# Patient Record
Sex: MALE | Race: WHITE | Employment: OTHER | URBAN - METROPOLITAN AREA
[De-identification: names, ages, dates, MRNs, and addresses within clinical notes are randomized per-mention and may not be internally consistent; named-entity substitution may affect disease eponyms.]

---

## 2017-03-29 ENCOUNTER — HOSPITAL ENCOUNTER (EMERGENCY)
Age: 47
Discharge: HOME | End: 2017-03-29
Attending: EMERGENCY MEDICINE
Payer: COMMERCIAL

## 2017-03-29 ENCOUNTER — APPOINTMENT (OUTPATIENT)
Dept: ULTRASOUND IMAGING | Age: 47
End: 2017-03-29
Attending: EMERGENCY MEDICINE
Payer: COMMERCIAL

## 2017-03-29 VITALS
DIASTOLIC BLOOD PRESSURE: 83 MMHG | HEART RATE: 99 BPM | HEIGHT: 69 IN | TEMPERATURE: 98 F | WEIGHT: 200 LBS | OXYGEN SATURATION: 99 % | BODY MASS INDEX: 29.62 KG/M2 | SYSTOLIC BLOOD PRESSURE: 139 MMHG | RESPIRATION RATE: 18 BRPM

## 2017-03-29 DIAGNOSIS — I80.8 THROMBOPHLEBITIS ARM: Primary | ICD-10-CM

## 2017-03-29 PROCEDURE — 99284 EMERGENCY DEPT VISIT MOD MDM: CPT | Performed by: EMERGENCY MEDICINE

## 2017-03-29 PROCEDURE — 93971 EXTREMITY STUDY: CPT

## 2017-03-29 PROCEDURE — 74011250637 HC RX REV CODE- 250/637: Performed by: EMERGENCY MEDICINE

## 2017-03-29 RX ORDER — SULFAMETHOXAZOLE AND TRIMETHOPRIM 800; 160 MG/1; MG/1
1 TABLET ORAL 2 TIMES DAILY
Qty: 14 TAB | Refills: 0 | Status: SHIPPED | OUTPATIENT
Start: 2017-03-29 | End: 2017-04-05

## 2017-03-29 RX ORDER — OXYCODONE HYDROCHLORIDE 5 MG/1
5 TABLET ORAL
Status: COMPLETED | OUTPATIENT
Start: 2017-03-29 | End: 2017-03-29

## 2017-03-29 RX ADMIN — OXYCODONE HYDROCHLORIDE 5 MG: 5 TABLET ORAL at 09:12

## 2017-03-29 NOTE — DISCHARGE INSTRUCTIONS
Hematoma: Care Instructions  Your Care Instructions  A hematoma is a bad bruise. It happens when an injury causes blood to collect and pool under the skin. The pooling blood gives the skin a spongy, rubbery, lumpy feel. A hematoma usually is not a cause for concern. It is not the same thing as a blood clot in a vein, and it does not cause blood clots. Follow-up care is a key part of your treatment and safety. Be sure to make and go to all appointments, and call your doctor if you are having problems. It's also a good idea to know your test results and keep a list of the medicines you take. How can you care for yourself at home? · Rest and protect the bruised area. · Put ice or a cold pack on the area for 10 to 20 minutes at a time. · Prop up the bruised area on a pillow when you ice it or anytime you sit or lie down during the next 3 days. Try to keep it above the level of your heart. This will help reduce swelling. · Wrapping the bruised area with an elastic bandage such as an Ace wrap will help decrease swelling. Don't wrap it too tightly, as this can cause more swelling below the affected area. · Take an over-the-counter pain medicine, such as acetaminophen (Tylenol), ibuprofen (Advil, Motrin), or naproxen (Aleve). · Do not take two or more pain medicines at the same time unless the doctor told you to. Many pain medicines have acetaminophen, which is Tylenol. Too much acetaminophen (Tylenol) can be harmful. When should you call for help? Call your doctor now or seek immediate medical care if:  · You have signs of skin infection, such as:  ¨ Increased pain, swelling, warmth, or redness. ¨ Red streaks leading from the area. ¨ Pus draining from the area. ¨ A fever. Watch closely for changes in your health, and be sure to contact your doctor if:  · The bruise lasts longer than 4 weeks. · The bruise gets bigger or becomes more painful. · You do not get better as expected.   Where can you learn more?  Go to http://tessie-georgina.info/. Enter P911 in the search box to learn more about \"Hematoma: Care Instructions. \"  Current as of: May 27, 2016  Content Version: 11.2  © 4835-5425 [employer]. Care instructions adapted under license by [employer] (which disclaims liability or warranty for this information). If you have questions about a medical condition or this instruction, always ask your healthcare professional. [address] any warranty or liability for your use of this information.

## 2017-03-29 NOTE — ED PROVIDER NOTES
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 Then, 2 days ago, he was hit with a bungee cord in the left antecubital region. He has been being treated for a paronychia of his left thumb for the past several days with doxycycline and Voltaren. He then saw his primary care physician yesterday for pain and swelling in his left AC region. He was started on Ultram and zofran for pain. He had x-ray to rule out foreign body. He reports increased pain and swelling to the before meals region. He's had one episode of vomiting. No fever. He denies IV drug use. Patient is a 51 y.o. male presenting with arm problem. The history is provided by the patient. Arm Injury    Pertinent negatives include no back pain. Past Medical History:   Diagnosis Date    Hepatitis C     History of drug abuse        Past Surgical History:   Procedure Laterality Date    HX HERNIA REPAIR           Family History:   Problem Relation Age of Onset    Psychiatric Disorder Mother     Cancer Mother      leukemia    Alcohol abuse Father      Drug abuse       Social History     Social History    Marital status:      Spouse name: N/A    Number of children: N/A    Years of education: N/A     Occupational History    Not on file. Social History Main Topics    Smoking status: Current Every Day Smoker     Packs/day: 1.70     Years: 20.00     Types: Cigarettes    Smokeless tobacco: Never Used    Alcohol use No    Drug use: Yes     Special: Opiates      Comment: History of    Sexual activity: Not on file     Other Topics Concern    Not on file     Social History Narrative         ALLERGIES: Review of patient's allergies indicates no known allergies. Review of Systems   Constitutional: Negative for chills and fever. HENT: Negative for hearing loss. Eyes: Negative for visual disturbance.    Respiratory: Negative for cough and shortness of breath. Cardiovascular: Negative for chest pain and palpitations. Gastrointestinal: Positive for nausea and vomiting. Negative for abdominal pain and diarrhea. Musculoskeletal: Positive for myalgias. Negative for back pain. Skin: Positive for color change. Negative for rash. Neurological: Negative for weakness and headaches. Psychiatric/Behavioral: Negative for confusion. Vitals:    03/29/17 0841   BP: 144/82   Pulse: 99   Resp: 17   Temp: 97.9 °F (36.6 °C)   SpO2: 99%   Weight: 90.7 kg (200 lb)   Height: 5' 9\" (1.753 m)            Physical Exam   Constitutional: He appears well-developed and well-nourished. HENT:   Head: Normocephalic and atraumatic. Right Ear: External ear normal.   Left Ear: External ear normal.   Nose: Nose normal.   Mouth/Throat: Oropharynx is clear and moist.   Eyes: Conjunctivae are normal. Pupils are equal, round, and reactive to light. Neck: Normal range of motion. Neck supple. Cardiovascular: Regular rhythm, normal heart sounds and intact distal pulses. Pulmonary/Chest: Effort normal and breath sounds normal. No respiratory distress. He has no wheezes. Abdominal: Soft. Bowel sounds are normal. He exhibits no distension. There is no tenderness. Musculoskeletal: Normal range of motion. Left elbow: He exhibits swelling. He exhibits normal range of motion. Tenderness found. Arms:  Neurological: He is alert. Skin: Skin is warm and dry. Psychiatric: Judgment normal.   Nursing note and vitals reviewed. MDM  Number of Diagnoses or Management Options  Diagnosis management comments: Parts of this document were created using dragon voice recognition software. The chart has been reviewed but errors may still be present. Appears infectious. Patient denies IV drug use. Will check ultrasound. Tetanus up-to-date. Pain treated. 11:01 AM  No abscess or DVT appreciated on ultrasound.   Only has a few days left of doxycycline, could be early thrombophlebitis or infection. Will place on Bactrim. Advised to continue ultram.     I discussed the results of all labs, procedures, radiographs, and treatments with the patient and available family. Treatment plan is agreed upon and the patient is ready for discharge. Questions about treatment in the ED and differential diagnosis of presenting condition were answered. Patient was given verbal discharge instructions including, but not limited to, importance of returning to the emergency department for any concern of worsening or continued symptoms. Instructions were given to follow up with a primary care provider or specialist within 1-2 days. Adverse effects of medications, if prescribed, were discussed and patient was advised to refrain from significant physical activity until followed up by primary care physician and to not drive or operate heavy machinery after taking any sedating substances. Amount and/or Complexity of Data Reviewed  Tests in the radiology section of CPT®: ordered and reviewed (Duplex Upper Ext Venous Left    Result Date: 3/29/2017  LEFT UPPER EXTREMITY VENOUS DUPLEX ULTRASOUND. HISTORY: Pain. TECHNIQUE: Direct skin-contact high resolution grayscale images, color-flow Doppler and duplex waveform analysis. FINDINGS: There is color-flow assignment on color-flow Doppler and duplex venous waveforms in the internal jugular, subclavian, axillary, brachial, radial and ulnar veins. The peripheral veins are compressible. There is thrombus in a superficial vein. Probable small, 1.2 x 1.4 cm hematoma in the soft tissues. IMPRESSION: Negative for sonographic evidence of deep venous thrombosis left upper extremity.  Other findings as above.     )  Tests in the medicine section of CPT®: ordered and reviewed  Review and summarize past medical records: yes      ED Course       Procedures

## 2017-03-29 NOTE — ED TRIAGE NOTES
Pt in c/o left arm pain x 2 days. States he hit it with a nail and it began bleeding 2 days ago. States seen by pcp and prescribed doxycycline. States yesterday he hit the same arm with a bungie cord and has had increased pain since then.

## 2017-03-30 ENCOUNTER — HOSPITAL ENCOUNTER (EMERGENCY)
Age: 47
Discharge: HOME | End: 2017-03-30
Attending: EMERGENCY MEDICINE
Payer: COMMERCIAL

## 2017-03-30 VITALS
HEART RATE: 82 BPM | RESPIRATION RATE: 16 BRPM | HEIGHT: 69 IN | OXYGEN SATURATION: 100 % | DIASTOLIC BLOOD PRESSURE: 84 MMHG | TEMPERATURE: 98.2 F | WEIGHT: 200 LBS | BODY MASS INDEX: 29.62 KG/M2 | SYSTOLIC BLOOD PRESSURE: 142 MMHG

## 2017-03-30 DIAGNOSIS — L03.114 CELLULITIS OF LEFT ARM: ICD-10-CM

## 2017-03-30 DIAGNOSIS — L02.414 ABSCESS OF LEFT ARM: Primary | ICD-10-CM

## 2017-03-30 PROCEDURE — 87186 SC STD MICRODIL/AGAR DIL: CPT | Performed by: EMERGENCY MEDICINE

## 2017-03-30 PROCEDURE — 99283 EMERGENCY DEPT VISIT LOW MDM: CPT | Performed by: EMERGENCY MEDICINE

## 2017-03-30 PROCEDURE — 74011250637 HC RX REV CODE- 250/637: Performed by: EMERGENCY MEDICINE

## 2017-03-30 PROCEDURE — 87205 SMEAR GRAM STAIN: CPT | Performed by: EMERGENCY MEDICINE

## 2017-03-30 PROCEDURE — 75810000289 HC I&D ABSCESS SIMP/COMP/MULT: Performed by: EMERGENCY MEDICINE

## 2017-03-30 PROCEDURE — 87077 CULTURE AEROBIC IDENTIFY: CPT | Performed by: EMERGENCY MEDICINE

## 2017-03-30 PROCEDURE — 77030019895 HC PCKNG STRP IODO -A

## 2017-03-30 RX ORDER — CLINDAMYCIN HYDROCHLORIDE 150 MG/1
150 CAPSULE ORAL 4 TIMES DAILY
Qty: 28 CAP | Refills: 0 | Status: SHIPPED | OUTPATIENT
Start: 2017-03-30 | End: 2017-04-06

## 2017-03-30 RX ORDER — IBUPROFEN 800 MG/1
800 TABLET ORAL
Status: COMPLETED | OUTPATIENT
Start: 2017-03-30 | End: 2017-03-30

## 2017-03-30 RX ADMIN — IBUPROFEN 800 MG: 800 TABLET, FILM COATED ORAL at 10:11

## 2017-03-30 NOTE — ED TRIAGE NOTES
Pt has swelling in middle of left arm that is worse since evaluation yesterday. States he is taking antibiotics and had an ultrasound here yesterday but the swelling is worse.

## 2017-03-30 NOTE — DISCHARGE INSTRUCTIONS
Skin Abscess: Care Instructions  Your Care Instructions    A skin abscess is a bacterial infection that forms a pocket of pus. A boil is a kind of skin abscess. The doctor may have cut an opening in the abscess so that the pus can drain out. You may have gauze in the cut so that the abscess will stay open and keep draining. You may need antibiotics. You will need to follow up with your doctor to make sure the infection has gone away. The doctor has checked you carefully, but problems can develop later. If you notice any problems or new symptoms, get medical treatment right away. Follow-up care is a key part of your treatment and safety. Be sure to make and go to all appointments, and call your doctor if you are having problems. It's also a good idea to know your test results and keep a list of the medicines you take. How can you care for yourself at home? · Apply warm and dry compresses, a heating pad set on low, or a hot water bottle 3 or 4 times a day for pain. Keep a cloth between the heat source and your skin. · If your doctor prescribed antibiotics, take them as directed. Do not stop taking them just because you feel better. You need to take the full course of antibiotics. · Take pain medicines exactly as directed. ¨ If the doctor gave you a prescription medicine for pain, take it as prescribed. ¨ If you are not taking a prescription pain medicine, ask your doctor if you can take an over-the-counter medicine. · Keep your bandage clean and dry. Change the bandage whenever it gets wet or dirty, or at least one time a day. · If the abscess was packed with gauze:  ¨ Keep follow-up appointments to have the gauze changed or removed. If the doctor instructed you to remove the gauze, gently pull out all of the gauze when your doctor tells you to. ¨ After the gauze is removed, soak the area in warm water for 15 to 20 minutes 2 times a day, until the wound closes. When should you call for help?   Call your doctor now or seek immediate medical care if:  · You have signs of worsening infection, such as:  ¨ Increased pain, swelling, warmth, or redness. ¨ Red streaks leading from the infected skin. ¨ Pus draining from the wound. ¨ A fever. Watch closely for changes in your health, and be sure to contact your doctor if:  · You do not get better as expected. Where can you learn more? Go to http://tessie-georgina.info/. Enter N523 in the search box to learn more about \"Skin Abscess: Care Instructions. \"  Current as of: October 13, 2016  Content Version: 11.2  © 4880-0766 [employer]. Care instructions adapted under license by [employer] (which disclaims liability or warranty for this information). If you have questions about a medical condition or this instruction, always ask your healthcare professional. [place] [address] any warranty or liability for your use of this information. Cellulitis: Care Instructions  Your Care Instructions    Cellulitis is a skin infection. It often occurs after a break in the skin from a scrape, cut, bite, or puncture, or after a rash. The doctor has checked you carefully, but problems can develop later. If you notice any problems or new symptoms, get medical treatment right away. Follow-up care is a key part of your treatment and safety. Be sure to make and go to all appointments, and call your doctor if you are having problems. It's also a good idea to know your test results and keep a list of the medicines you take. How can you care for yourself at home? · Take your antibiotics as directed. Do not stop taking them just because you feel better. You need to take the full course of antibiotics. · Prop up the infected area on pillows to reduce pain and swelling. Try to keep the area above the level of your heart as often as you can.   · If your doctor told you how to care for your wound, follow your doctor's instructions. If you did not get instructions, follow this general advice:  ¨ Wash the wound with clean water 2 times a day. Don't use hydrogen peroxide or alcohol, which can slow healing. ¨ You may cover the wound with a thin layer of petroleum jelly, such as Vaseline, and a nonstick bandage. ¨ Apply more petroleum jelly and replace the bandage as needed. · Be safe with medicines. Take pain medicines exactly as directed. ¨ If the doctor gave you a prescription medicine for pain, take it as prescribed. ¨ If you are not taking a prescription pain medicine, ask your doctor if you can take an over-the-counter medicine. To prevent cellulitis in the future  · Try to prevent cuts, scrapes, or other injuries to your skin. Cellulitis most often occurs where there is a break in the skin. · If you get a scrape, cut, mild burn, or bite, wash the wound with clean water as soon as you can to help avoid infection. Don't use hydrogen peroxide or alcohol, which can slow healing. · If you have swelling in your legs (edema), support stockings and good skin care may help prevent leg sores and cellulitis. · Take care of your feet, especially if you have diabetes or other conditions that increase the risk of infection. Wear shoes and socks. Do not go barefoot. If you have athlete's foot or other skin problems on your feet, talk to your doctor about how to treat them. When should you call for help? Call your doctor now or seek immediate medical care if:  · You have signs that your infection is getting worse, such as:  ¨ Increased pain, swelling, warmth, or redness. ¨ Red streaks leading from the area. ¨ Pus draining from the area. ¨ A fever. · You get a rash. Watch closely for changes in your health, and be sure to contact your doctor if:  · You are not getting better after 1 day (24 hours). · You do not get better as expected. Where can you learn more? Go to http://marichuy.info/.   Keron Gilliam in the search box to learn more about \"Cellulitis: Care Instructions. \"  Current as of: October 13, 2016  Content Version: 11.2  © 5462-9745 [email or web address]. Care instructions adapted under license by [employer] (which disclaims liability or warranty for this information). If you have questions about a medical condition or this instruction, always ask your healthcare professional. [place] [address] any warranty or liability for your use of this information.

## 2017-03-30 NOTE — ED PROVIDER NOTES
HPI Comments: 78-year-old with a history of IV drug abuse he recently injected unknown dissolved pill 3-4 days ago presents with pain and swelling and redness to his left antecubital fossa. He was seen here yesterday had an ultrasound of the arm that showed no DVT and supposedly no abscess. He was started on Bactrim possibly doxycycline. No improvement and patient feels worse with increased pain. Patient is a 51 y.o. male presenting with skin infection. The history is provided by the patient. Skin Infection   This is a new problem. The current episode started 2 days ago. The problem occurs constantly. The problem has been gradually worsening. Pertinent negatives include no chest pain and no shortness of breath. Exacerbated by: movement and palpation. Nothing relieves the symptoms. Treatments tried: antibiotic started yesterday. The treatment provided no relief. Past Medical History:   Diagnosis Date    Hepatitis C     History of drug abuse        Past Surgical History:   Procedure Laterality Date    HX HERNIA REPAIR           Family History:   Problem Relation Age of Onset    Psychiatric Disorder Mother     Cancer Mother      leukemia    Alcohol abuse Father      Drug abuse       Social History     Social History    Marital status:      Spouse name: N/A    Number of children: N/A    Years of education: N/A     Occupational History    Not on file. Social History Main Topics    Smoking status: Current Every Day Smoker     Packs/day: 1.70     Years: 20.00     Types: Cigarettes    Smokeless tobacco: Never Used    Alcohol use No    Drug use: Yes     Special: Opiates      Comment: History of    Sexual activity: Not on file     Other Topics Concern    Not on file     Social History Narrative         ALLERGIES: Review of patient's allergies indicates no known allergies. Review of Systems   Constitutional: Positive for fever. Negative for chills.    Respiratory: Negative for cough and shortness of breath. Cardiovascular: Negative for chest pain and palpitations. Gastrointestinal: Negative for nausea and vomiting. Musculoskeletal: Negative for myalgias. Skin: Positive for color change and wound. Neurological: Negative for weakness and numbness. Vitals:    03/30/17 0928   BP: 144/87   Pulse: 85   Resp: 16   Temp: 98 °F (36.7 °C)   SpO2: 100%   Weight: 90.7 kg (200 lb)   Height: 5' 9\" (1.753 m)            Physical Exam   Constitutional: He appears well-developed and well-nourished. HENT:   Mouth/Throat: Oropharynx is clear and moist. No oropharyngeal exudate. Eyes: Conjunctivae are normal. Pupils are equal, round, and reactive to light. Cardiovascular: Normal rate, regular rhythm, normal heart sounds and intact distal pulses. No murmur heard. Pulmonary/Chest: Effort normal and breath sounds normal.   Abdominal: Soft. Bowel sounds are normal. He exhibits no distension. There is no tenderness. Musculoskeletal: Normal range of motion. Neurological: He is alert. Skin: Skin is warm and dry. Erythema about the entire antecubital fossa with 2-3 cm diameter area of fluctuance and tenderness centrally located. Nursing note and vitals reviewed. MDM  Number of Diagnoses or Management Options  Diagnosis management comments: Probable abscess left antecubital fossa from recent IV drug use. Ultrasound yesterday showed no DVT. Bedside ED ultrasound with the linear probe reveals a 2 cm diameter abscess that is just under a centimeter deep to 2 cm deep. I do not appreciate any arterial flow adjacent superficial 2 or deep to the abscess. I do not appreciate any veins in the immediate area.       ED Course       I&D Abcess Complex  Date/Time: 3/30/2017 10:30 AM  Performed by: Sienna Cunningham  Authorized by: Sienna Cunningham     Consent:     Consent obtained:  Verbal    Consent given by:  Patient    Risks discussed:  Bleeding, damage to other organs, infection, pain and incomplete drainage    Alternatives discussed:  No treatment  Location:     Type:  Abscess    Location:  Upper extremity  Pre-procedure details:     Skin preparation:  Betadine  Anesthesia (see MAR for exact dosages): Anesthesia method:  Local infiltration  Procedure type:     Complexity:  Complex  Procedure details:     Needle aspiration: yes      Needle size:  18 G    Incision types:  Single straight    Incision depth:  Subcutaneous    Scalpel blade:  11    Wound management:  Probed and deloculated    Drainage:  Purulent and bloody    Drainage amount:  Copious    Wound treatment:  Wound left open    Packing materials:  1/4 in iodoform gauze  Post-procedure details:     Patient tolerance of procedure: Tolerated well, no immediate complications  Comments:      Ultrasound guidance and evaluation was used. Neurovascularly intact following the procedure.

## 2017-03-30 NOTE — Clinical Note
Continue current antibiotic. Take Keflex as well. Continue your phone parent and 30 taking otherwise he can take ibuprofen for pain as well. Tylenol for pain. Return in 48 hours for wound recheck and packing removal.  Return sooner if worse.

## 2017-04-02 LAB
BACTERIA SPEC CULT: ABNORMAL
GRAM STN SPEC: ABNORMAL
GRAM STN SPEC: ABNORMAL
SERVICE CMNT-IMP: ABNORMAL

## 2017-04-11 NOTE — PROGRESS NOTES
Spoke with patient- he states the wound is completely healed; no problems fever, nausea, vomiting. Declines offer for additional antibiotics. Has a PCP- he will follow-up if necessary.

## 2017-04-15 ENCOUNTER — HOSPITAL ENCOUNTER (EMERGENCY)
Age: 47
Discharge: HOME | End: 2017-04-15
Attending: STUDENT IN AN ORGANIZED HEALTH CARE EDUCATION/TRAINING PROGRAM
Payer: COMMERCIAL

## 2017-04-15 VITALS
BODY MASS INDEX: 29.62 KG/M2 | SYSTOLIC BLOOD PRESSURE: 118 MMHG | DIASTOLIC BLOOD PRESSURE: 74 MMHG | HEIGHT: 69 IN | TEMPERATURE: 97.8 F | OXYGEN SATURATION: 99 % | WEIGHT: 200 LBS | RESPIRATION RATE: 16 BRPM | HEART RATE: 72 BPM

## 2017-04-15 DIAGNOSIS — S01.311A EAR LOBE LACERATION, RIGHT, INITIAL ENCOUNTER: Primary | ICD-10-CM

## 2017-04-15 PROCEDURE — 99283 EMERGENCY DEPT VISIT LOW MDM: CPT | Performed by: STUDENT IN AN ORGANIZED HEALTH CARE EDUCATION/TRAINING PROGRAM

## 2017-04-15 PROCEDURE — 77030002916 HC SUT ETHLN J&J -A

## 2017-04-15 PROCEDURE — 75810000293 HC SIMP/SUPERF WND  RPR: Performed by: STUDENT IN AN ORGANIZED HEALTH CARE EDUCATION/TRAINING PROGRAM

## 2017-04-15 NOTE — DISCHARGE INSTRUCTIONS
Cuts: Care Instructions  Your Care Instructions  A cut can happen anywhere on your body. Stitches, staples, skin adhesives, or pieces of tape called Steri-Strips are sometimes used to keep the edges of a cut together and help it heal. Steri-Strips can be used by themselves or with stitches or staples. Sometimes cuts are left open. If the cut went deep and through the skin, the doctor may have closed the cut in two layers. A deeper layer of stitches brings the deep part of the cut together. These stitches will dissolve and don't need to be removed. The upper layer closure, which could be stitches, staples, Steri-Strips, or adhesive, is what you see on the cut. A cut is often covered by a bandage. The doctor has checked you carefully, but problems can develop later. If you notice any problems or new symptoms, get medical treatment right away. Follow-up care is a key part of your treatment and safety. Be sure to make and go to all appointments, and call your doctor if you are having problems. It's also a good idea to know your test results and keep a list of the medicines you take. How can you care for yourself at home? If a cut is open or closed  · Prop up the sore area on a pillow anytime you sit or lie down during the next 3 days. Try to keep it above the level of your heart. This will help reduce swelling. · Keep the cut dry for the first 24 to 48 hours. After this, you can shower if your doctor okays it. Pat the cut dry. · Don't soak the cut, such as in a bathtub. Your doctor will tell you when it's safe to get the cut wet. · After the first 24 to 48 hours, clean the cut with soap and water 2 times a day unless your doctor gives you different instructions. ¨ Don't use hydrogen peroxide or alcohol, which can slow healing. ¨ You may cover the cut with a thin layer of petroleum jelly and a nonstick bandage.   ¨ If the doctor put a bandage over the cut, put on a new bandage after cleaning the cut or if the bandage gets wet or dirty. · Avoid any activity that could cause your cut to reopen. · Be safe with medicines. Read and follow all instructions on the label. ¨ If the doctor gave you a prescription medicine for pain, take it as prescribed. ¨ If you are not taking a prescription pain medicine, ask your doctor if you can take an over-the-counter medicine. If the cut is closed with stitches, staples, or Steri-Strips  · Follow the above instructions for open or closed cuts. · Do not remove the stitches or staples on your own. Your doctor will tell you when to come back to have the stitches or staples removed. · Leave Steri-Strips on until they fall off. If the cut is closed with a skin adhesive  · Follow the above instructions for open or closed cuts. · Leave the skin adhesive on your skin until it falls off on its own. This may take 5 to 10 days. · Do not scratch, rub, or pick at the adhesive. · Do not put the sticky part of a bandage directly on the adhesive. · Do not put any kind of ointment, cream, or lotion over the area. This can make the adhesive fall off too soon. Do not use hydrogen peroxide or alcohol, which can slow healing. When should you call for help? Call your doctor now or seek immediate medical care if:  · You have new pain, or your pain gets worse. · The skin near the cut is cold or pale or changes color. · You have tingling, weakness, or numbness near the cut. · The cut starts to bleed, and blood soaks through the bandage. Oozing small amounts of blood is normal.  · You have trouble moving the area near the cut. · You have symptoms of infection, such as:  ¨ Increased pain, swelling, warmth, or redness around the cut. ¨ Red streaks leading from the cut. ¨ Pus draining from the cut. ¨ A fever. Watch closely for changes in your health, and be sure to contact your doctor if:  · The cut reopens. · You do not get better as expected. Where can you learn more?   Go to http://tessie-georgina.info/. Enter M735 in the search box to learn more about \"Cuts: Care Instructions. \"  Current as of: May 27, 2016  Content Version: 11.2  © 3976-7132 [employer]. Care instructions adapted under license by [employer] (which disclaims liability or warranty for this information). If you have questions about a medical condition or this instruction, always ask your healthcare professional. [place] [address] any warranty or liability for your use of this information.

## 2017-04-15 NOTE — Clinical Note
Return to this department or follow-up with her primary care provider in 7 days to have sutures removed. Please keep the area clean and dry. Monitor for signs of infection including increased pain, swelling or discharge from the area. Apply antibiotic  ointment several times daily to the area for the first 3 days. Avoid soaking this wound in a tub or pool for at least 48 hours. Return sooner with any concerns or questions.

## 2017-04-16 NOTE — ED PROVIDER NOTES
HPI Comments: 76-year-old male patient presents emergent department with reports of right earlobe pain after sustaining a laceration from his dog just prior to arrival.  Patient states his dog and Were fighting when he attempted to separate the animals and sustained a laceration to the right earlobe from his dog's foot. Patient denies any other injury at this time. Reports his last tetanus shot was administered of present one and half years ago. Patient denies any other complaints at this time. Patient is a 51 y.o. male presenting with skin laceration. The history is provided by the patient. No  was used. Laceration    The incident occurred 1 to 2 hours ago. Pain location: right earlobe. The laceration is 2 cm in size. Injury mechanism: dog toe nail. Foreign body present: no. The pain is at a severity of 4/10. The pain is mild. The pain has been constant since onset. Pertinent negatives include no numbness, no tingling, no weakness, no loss of motion, no coolness and no discoloration. The patient's last tetanus shot was less than 5 years ago. Past Medical History:   Diagnosis Date    Hepatitis C     History of drug abuse        Past Surgical History:   Procedure Laterality Date    HX HERNIA REPAIR           Family History:   Problem Relation Age of Onset    Psychiatric Disorder Mother     Cancer Mother      leukemia    Alcohol abuse Father      Drug abuse       Social History     Social History    Marital status:      Spouse name: N/A    Number of children: N/A    Years of education: N/A     Occupational History    Not on file.      Social History Main Topics    Smoking status: Current Every Day Smoker     Packs/day: 1.70     Years: 20.00     Types: Cigarettes    Smokeless tobacco: Never Used    Alcohol use No    Drug use: Yes     Special: Opiates      Comment: History of    Sexual activity: Not on file     Other Topics Concern    Not on file     Social History Narrative         ALLERGIES: Review of patient's allergies indicates no known allergies. Review of Systems   Constitutional: Negative for chills, diaphoresis and fever. HENT: Negative for congestion, sneezing and sore throat. Eyes: Negative for visual disturbance. Respiratory: Negative for cough, chest tightness, shortness of breath and wheezing. Cardiovascular: Negative for chest pain and leg swelling. Gastrointestinal: Negative for abdominal pain, blood in stool, diarrhea, nausea and vomiting. Endocrine: Negative for polyuria. Genitourinary: Negative for difficulty urinating, dysuria, flank pain, hematuria and urgency. Musculoskeletal: Negative for back pain, myalgias, neck pain and neck stiffness. Skin: Positive for wound. Negative for color change and rash. Neurological: Negative for dizziness, tingling, syncope, speech difficulty, weakness, light-headedness, numbness and headaches. Psychiatric/Behavioral: Negative for behavioral problems. All other systems reviewed and are negative. Vitals:    04/15/17 1701 04/15/17 1842   BP: 114/77 118/74   Pulse: 79 72   Resp: 18 16   Temp: 98.1 °F (36.7 °C) 97.8 °F (36.6 °C)   SpO2: 97% 99%   Weight: 90.7 kg (200 lb)    Height: 5' 9\" (1.753 m)             Physical Exam   Constitutional: He is oriented to person, place, and time. He appears well-developed and well-nourished. No distress. Alert and oriented to person, place and time. No acute distress. Speaks in clear, fluent sentences. HENT:   Head: Normocephalic and atraumatic. Right Ear: External ear normal.   Ears:    Nose: Nose normal.   There is a 2 cm full-thickness laceration to the patient's right earlobe. No visible cartilage exposed. Bleeding is controlled. Intra-auricular exam shows no acute abnormality. No other focal findings. Eyes: Conjunctivae and EOM are normal. Pupils are equal, round, and reactive to light. Neck: Normal range of motion. Neck supple.  No JVD present. No tracheal deviation present. Cardiovascular: Normal rate, regular rhythm, S1 normal, S2 normal, normal heart sounds and intact distal pulses. Exam reveals no gallop, no distant heart sounds and no friction rub. No murmur heard. Pulmonary/Chest: Effort normal and breath sounds normal. No accessory muscle usage or stridor. No tachypnea and no bradypnea. No respiratory distress. He has no decreased breath sounds. He has no wheezes. He has no rhonchi. He has no rales. He exhibits no tenderness. Abdominal: Soft. Normal appearance. He exhibits no distension and no mass. There is no hepatosplenomegaly, splenomegaly or hepatomegaly. There is no tenderness. There is no rigidity, no rebound, no guarding, no CVA tenderness, no tenderness at McBurney's point and negative Nicholas's sign. Musculoskeletal: Normal range of motion. He exhibits no edema, tenderness or deformity. Neurological: He is alert and oriented to person, place, and time. No cranial nerve deficit. Skin: Skin is warm and dry. No rash noted. He is not diaphoretic. Psychiatric: He has a normal mood and affect. His behavior is normal.   Nursing note and vitals reviewed. MDM  Number of Diagnoses or Management Options  Ear lobe laceration, right, initial encounter: new and requires workup  Diagnosis management comments: Laceration closed without difficulty. Patient advised to return in one week for suture removal.  Pulses understanding agreement with this plan. ED Course       Wound Repair  Date/Time: 4/15/2017 9:22 PM  Performed by: attendingPreparation: skin prepped with Betadine  Location: right earlobe.   Wound length:2.5 cm or less  Anesthesia: local infiltration    Anesthesia:  Anesthesia: local infiltration  Local Anesthetic: lidocaine 1% without epinephrine   Foreign bodies: no foreign bodies  Debridement: none  Skin closure: 5-0 nylon  Technique: simple  Approximation: close  Patient tolerance: Patient tolerated the procedure well with no immediate complications  My total time at bedside, performing this procedure was 1-15 minutes.

## 2017-08-17 ENCOUNTER — HOSPITAL ENCOUNTER (EMERGENCY)
Age: 47
Discharge: HOME | End: 2017-08-17
Attending: EMERGENCY MEDICINE
Payer: COMMERCIAL

## 2017-08-17 VITALS
TEMPERATURE: 97.9 F | WEIGHT: 185 LBS | DIASTOLIC BLOOD PRESSURE: 91 MMHG | BODY MASS INDEX: 29.03 KG/M2 | HEIGHT: 67 IN | HEART RATE: 79 BPM | OXYGEN SATURATION: 97 % | RESPIRATION RATE: 18 BRPM | SYSTOLIC BLOOD PRESSURE: 151 MMHG

## 2017-08-17 DIAGNOSIS — T23.291A: ICD-10-CM

## 2017-08-17 DIAGNOSIS — T23.211A: Primary | ICD-10-CM

## 2017-08-17 DIAGNOSIS — T23.231A: ICD-10-CM

## 2017-08-17 PROCEDURE — 74011000250 HC RX REV CODE- 250: Performed by: NURSE PRACTITIONER

## 2017-08-17 PROCEDURE — 75810000057 HC BURN CR DRS/DEB SM<5%TBSA: Performed by: NURSE PRACTITIONER

## 2017-08-17 PROCEDURE — 99283 EMERGENCY DEPT VISIT LOW MDM: CPT | Performed by: NURSE PRACTITIONER

## 2017-08-17 RX ORDER — ACETAMINOPHEN AND CODEINE PHOSPHATE 300; 30 MG/1; MG/1
1 TABLET ORAL
Qty: 24 TAB | Refills: 0 | Status: SHIPPED | OUTPATIENT
Start: 2017-08-17 | End: 2017-10-27

## 2017-08-17 RX ORDER — CEPHALEXIN 500 MG/1
500 CAPSULE ORAL 4 TIMES DAILY
Qty: 28 CAP | Refills: 0 | Status: SHIPPED | OUTPATIENT
Start: 2017-08-17 | End: 2017-08-24

## 2017-08-17 RX ORDER — SILVER SULFADIAZINE 10 G/1000G
CREAM TOPICAL
Status: COMPLETED | OUTPATIENT
Start: 2017-08-17 | End: 2017-08-17

## 2017-08-17 RX ADMIN — SILVER SULFADIAZINE: 10 CREAM TOPICAL at 12:29
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
## 2017-08-17 NOTE — DISCHARGE INSTRUCTIONS
Matthew: Care Instructions  Your Care Instructions    Matthew--even minor ones--can be very painful. A minor burn may heal within several days, while a more serious burn may take weeks or even months to heal completely. You may notice that the burned area feels tight and hard while it is healing. It is important to continue to move the area as the burn heals to prevent loss of motion or loss of function in the area. When your skin is damaged by a burn, you have a greater risk of infection. Keep the wound clean and change the bandages regularly to prevent infection and help the burn heal.  Burns can leave permanent scars. Taking good care of the burn as it heals may help prevent bad scars. The doctor has checked you carefully, but problems can develop later. If you notice any problems or new symptoms, get medical treatment right away. Follow-up care is a key part of your treatment and safety. Be sure to make and go to all appointments, and call your doctor if you are having problems. It's also a good idea to know your test results and keep a list of the medicines you take. How can you care for yourself at home? · If your doctor told you how to care for your burn, follow your doctor's instructions. If you did not get instructions, follow this general advice:  ¨ Wash the burn with clean water 2 times a day. Don't use hydrogen peroxide or alcohol, which can slow healing. ¨ Gently pat the burn dry after you wash it. ¨ You may cover the burn with a thin layer of petroleum jelly, such as Vaseline, and a nonstick bandage. ¨ Apply more petroleum jelly and replace the bandage as needed. · Protect your burn while it is healing. Cover your burn if you are going out in the cold or the sun. ¨ Wear long sleeves if the burn is on your hands or arms. ¨ Wear a hat if the burn is on your face. ¨ Wear socks and shoes if the burn is on your feet. · Do not break blisters open. This increases the chance of infection.  If a blister breaks open by itself, blot up the liquid, and leave the skin that covered the blister. This helps protect the new skin. · If your doctor prescribed antibiotics, take them as directed. Do not stop taking them just because you feel better. You need to take the full course of antibiotics. For pain and itching  · Take pain medicines exactly as directed. ¨ If the doctor gave you a prescription medicine for pain, take it as prescribed. ¨ If you are not taking a prescription pain medicine, ask your doctor if you can take an over-the-counter medicine. · If the burn itches, try not to scratch it. Try an over-the-counter antihistamine such as diphenhydramine (Benadryl) or loratadine (Claritin). Read and follow all instructions on the label. When should you call for help? Call your doctor now or seek immediate medical care if:  · Your pain gets worse. · You have symptoms of infection, such as:  ¨ Increased pain, swelling, warmth, or redness near the burn. ¨ Red streaks leading from the burn. ¨ Pus draining from the burn. ¨ A fever. Watch closely for changes in your health, and be sure to contact your doctor if:  · You do not get better as expected. Where can you learn more? Go to http://tessie-georgina.info/. Enter R455 in the search box to learn more about \"Burns: Care Instructions. \"  Current as of: March 20, 2017  Content Version: 11.3  © 5294-8161 [employer]. Care instructions adapted under license by [employer] (which disclaims liability or warranty for this information). If you have questions about a medical condition or this instruction, always ask your healthcare professional. [place] [address] any warranty or liability for your use of this information.
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
## 2017-12-01 ENCOUNTER — HOSPITAL ENCOUNTER (OUTPATIENT)
Dept: SURGERY | Age: 47
Discharge: HOME | End: 2017-12-01

## 2017-12-05 VITALS — WEIGHT: 200 LBS | HEIGHT: 68 IN | BODY MASS INDEX: 30.31 KG/M2

## 2017-12-05 NOTE — PERIOP NOTES
Patient verified name, , and surgery as listed in [facility]. Type 1B surgery, PAT phone assessment complete. Orders NOT received. Labs per surgeon: None. Labs per anesthesia protocol: None. Patient answered medical/surgical history questions at their best of ability. All prior to admission medications documented in [facility]. Patient instructed to take the following medications the day of surgery according to anesthesia guidelines with a small sip of water: Gabapentin. Hold all vitamins 7 days prior to surgery and NSAIDS 5 days prior to surgery. Medications to be held: Meloxicam.     Patient instructed on the following:  Arrive at [address], time of arrival to be called the day before by 1700  NPO after midnight including gum, mints, and ice chips  Responsible adult must drive patient to the hospital, stay during surgery, and patient will  need supervision 24 hours after anesthesia  Use antibacterial soap in shower the night before surgery and on the morning of surgery  Leave all valuables (money and jewelry) at home but bring insurance card and ID on       DOS  Do not wear make-up, nail polish, lotions, cologne, perfumes, powders, or oil on skin. Patient teach back successful and patient demonstrates knowledge of instruction.

## 2017-12-08 ENCOUNTER — HOSPITAL ENCOUNTER (OUTPATIENT)
Age: 47
Setting detail: OUTPATIENT SURGERY
Discharge: HOME | End: 2017-12-08
Attending: SURGERY | Admitting: SURGERY
Payer: COMMERCIAL

## 2017-12-08 ENCOUNTER — ANESTHESIA EVENT (OUTPATIENT)
Dept: SURGERY | Age: 47
End: 2017-12-08
Payer: COMMERCIAL

## 2017-12-08 ENCOUNTER — ANESTHESIA (OUTPATIENT)
Dept: SURGERY | Age: 47
End: 2017-12-08
Payer: COMMERCIAL

## 2017-12-08 VITALS
WEIGHT: 195 LBS | HEART RATE: 79 BPM | DIASTOLIC BLOOD PRESSURE: 96 MMHG | SYSTOLIC BLOOD PRESSURE: 135 MMHG | TEMPERATURE: 97.3 F | RESPIRATION RATE: 14 BRPM | OXYGEN SATURATION: 100 % | BODY MASS INDEX: 29.65 KG/M2

## 2017-12-08 DIAGNOSIS — Z41.9 SURGERY, ELECTIVE: ICD-10-CM

## 2017-12-08 PROCEDURE — 77030002966 HC SUT PDS J&J -A: Performed by: SURGERY

## 2017-12-08 PROCEDURE — 74011000250 HC RX REV CODE- 250: Performed by: ANESTHESIOLOGY

## 2017-12-08 PROCEDURE — 74011250636 HC RX REV CODE- 250/636

## 2017-12-08 PROCEDURE — 77030008477 HC STYL SATN SLP COVD -A: Performed by: ANESTHESIOLOGY

## 2017-12-08 PROCEDURE — 74011250636 HC RX REV CODE- 250/636: Performed by: ANESTHESIOLOGY

## 2017-12-08 PROCEDURE — 77030018836 HC SOL IRR NACL ICUM -A: Performed by: SURGERY

## 2017-12-08 PROCEDURE — 76210000021 HC REC RM PH II 0.5 TO 1 HR: Performed by: SURGERY

## 2017-12-08 PROCEDURE — 77030002996 HC SUT SLK J&J -A: Performed by: SURGERY

## 2017-12-08 PROCEDURE — 76210000006 HC OR PH I REC 0.5 TO 1 HR: Performed by: SURGERY

## 2017-12-08 PROCEDURE — 74011250637 HC RX REV CODE- 250/637: Performed by: ANESTHESIOLOGY

## 2017-12-08 PROCEDURE — 77030002912 HC SUT ETHBND J&J -A: Performed by: SURGERY

## 2017-12-08 PROCEDURE — 77030008703 HC TU ET UNCUF COVD -A: Performed by: ANESTHESIOLOGY

## 2017-12-08 PROCEDURE — 77030031139 HC SUT VCRL2 J&J -A: Performed by: SURGERY

## 2017-12-08 PROCEDURE — 77030011640 HC PAD GRND REM COVD -A: Performed by: SURGERY

## 2017-12-08 PROCEDURE — 76010000138 HC OR TIME 0.5 TO 1 HR: Performed by: SURGERY

## 2017-12-08 PROCEDURE — 77030002916 HC SUT ETHLN J&J -A: Performed by: SURGERY

## 2017-12-08 PROCEDURE — 76060000032 HC ANESTHESIA 0.5 TO 1 HR: Performed by: SURGERY

## 2017-12-08 PROCEDURE — 74011000250 HC RX REV CODE- 250

## 2017-12-08 PROCEDURE — 74011000250 HC RX REV CODE- 250: Performed by: SURGERY

## 2017-12-08 RX ORDER — HEPARIN SODIUM 5000 [USP'U]/ML
5000 INJECTION, SOLUTION INTRAVENOUS; SUBCUTANEOUS ONCE
Status: COMPLETED | OUTPATIENT
Start: 2017-12-08 | End: 2017-12-08

## 2017-12-08 RX ORDER — BUPIVACAINE HYDROCHLORIDE 2.5 MG/ML
INJECTION, SOLUTION EPIDURAL; INFILTRATION; INTRACAUDAL AS NEEDED
Status: DISCONTINUED | OUTPATIENT
Start: 2017-12-08 | End: 2017-12-08 | Stop reason: HOSPADM

## 2017-12-08 RX ORDER — OXYCODONE AND ACETAMINOPHEN 5; 325 MG/1; MG/1
1 TABLET ORAL
Qty: 40 TAB | Refills: 0 | Status: SHIPPED | OUTPATIENT
Start: 2017-12-08 | End: 2018-06-12

## 2017-12-08 RX ORDER — ONDANSETRON 2 MG/ML
INJECTION INTRAMUSCULAR; INTRAVENOUS AS NEEDED
Status: DISCONTINUED | OUTPATIENT
Start: 2017-12-08 | End: 2017-12-08 | Stop reason: HOSPADM

## 2017-12-08 RX ORDER — SODIUM CHLORIDE 0.9 % (FLUSH) 0.9 %
5-10 SYRINGE (ML) INJECTION AS NEEDED
Status: DISCONTINUED | OUTPATIENT
Start: 2017-12-08 | End: 2017-12-08 | Stop reason: HOSPADM

## 2017-12-08 RX ORDER — SUCCINYLCHOLINE CHLORIDE 20 MG/ML
INJECTION INTRAMUSCULAR; INTRAVENOUS AS NEEDED
Status: DISCONTINUED | OUTPATIENT
Start: 2017-12-08 | End: 2017-12-08 | Stop reason: HOSPADM

## 2017-12-08 RX ORDER — LIDOCAINE HYDROCHLORIDE 20 MG/ML
INJECTION, SOLUTION EPIDURAL; INFILTRATION; INTRACAUDAL; PERINEURAL AS NEEDED
Status: DISCONTINUED | OUTPATIENT
Start: 2017-12-08 | End: 2017-12-08 | Stop reason: HOSPADM

## 2017-12-08 RX ORDER — FENTANYL CITRATE 50 UG/ML
INJECTION, SOLUTION INTRAMUSCULAR; INTRAVENOUS AS NEEDED
Status: DISCONTINUED | OUTPATIENT
Start: 2017-12-08 | End: 2017-12-08 | Stop reason: ALTCHOICE

## 2017-12-08 RX ORDER — ACETAMINOPHEN 500 MG
1000 TABLET ORAL
Status: COMPLETED | OUTPATIENT
Start: 2017-12-08 | End: 2017-12-08

## 2017-12-08 RX ORDER — SODIUM CHLORIDE, SODIUM LACTATE, POTASSIUM CHLORIDE, CALCIUM CHLORIDE 600; 310; 30; 20 MG/100ML; MG/100ML; MG/100ML; MG/100ML
75 INJECTION, SOLUTION INTRAVENOUS CONTINUOUS
Status: DISCONTINUED | OUTPATIENT
Start: 2017-12-08 | End: 2017-12-08 | Stop reason: HOSPADM

## 2017-12-08 RX ORDER — HYDROMORPHONE HYDROCHLORIDE 2 MG/ML
0.5 INJECTION, SOLUTION INTRAMUSCULAR; INTRAVENOUS; SUBCUTANEOUS
Status: DISCONTINUED | OUTPATIENT
Start: 2017-12-08 | End: 2017-12-08 | Stop reason: ALTCHOICE

## 2017-12-08 RX ORDER — LIDOCAINE HYDROCHLORIDE 10 MG/ML
0.1 INJECTION INFILTRATION; PERINEURAL AS NEEDED
Status: DISCONTINUED | OUTPATIENT
Start: 2017-12-08 | End: 2017-12-08 | Stop reason: HOSPADM

## 2017-12-08 RX ORDER — CEFAZOLIN SODIUM IN 0.9 % NACL 2 G/100 ML
2 PLASTIC BAG, INJECTION (ML) INTRAVENOUS ONCE
Status: COMPLETED | OUTPATIENT
Start: 2017-12-08 | End: 2017-12-08

## 2017-12-08 RX ORDER — DEXAMETHASONE SODIUM PHOSPHATE 4 MG/ML
INJECTION, SOLUTION INTRA-ARTICULAR; INTRALESIONAL; INTRAMUSCULAR; INTRAVENOUS; SOFT TISSUE AS NEEDED
Status: DISCONTINUED | OUTPATIENT
Start: 2017-12-08 | End: 2017-12-08 | Stop reason: HOSPADM

## 2017-12-08 RX ORDER — NALOXONE HYDROCHLORIDE 0.4 MG/ML
0.2 INJECTION, SOLUTION INTRAMUSCULAR; INTRAVENOUS; SUBCUTANEOUS AS NEEDED
Status: DISCONTINUED | OUTPATIENT
Start: 2017-12-08 | End: 2017-12-08 | Stop reason: HOSPADM

## 2017-12-08 RX ORDER — ROCURONIUM BROMIDE 10 MG/ML
INJECTION, SOLUTION INTRAVENOUS AS NEEDED
Status: DISCONTINUED | OUTPATIENT
Start: 2017-12-08 | End: 2017-12-08 | Stop reason: HOSPADM

## 2017-12-08 RX ORDER — FAMOTIDINE 20 MG/1
20 TABLET, FILM COATED ORAL ONCE
Status: COMPLETED | OUTPATIENT
Start: 2017-12-08 | End: 2017-12-08

## 2017-12-08 RX ORDER — MIDAZOLAM HYDROCHLORIDE 1 MG/ML
2 INJECTION, SOLUTION INTRAMUSCULAR; INTRAVENOUS
Status: DISCONTINUED | OUTPATIENT
Start: 2017-12-08 | End: 2017-12-08 | Stop reason: HOSPADM

## 2017-12-08 RX ORDER — OXYCODONE AND ACETAMINOPHEN 5; 325 MG/1; MG/1
1 TABLET ORAL AS NEEDED
Status: DISCONTINUED | OUTPATIENT
Start: 2017-12-08 | End: 2017-12-08 | Stop reason: HOSPADM

## 2017-12-08 RX ORDER — PROPOFOL 10 MG/ML
INJECTION, EMULSION INTRAVENOUS AS NEEDED
Status: DISCONTINUED | OUTPATIENT
Start: 2017-12-08 | End: 2017-12-08 | Stop reason: HOSPADM

## 2017-12-08 RX ADMIN — ROCURONIUM BROMIDE 5 MG: 10 INJECTION, SOLUTION INTRAVENOUS at 16:03

## 2017-12-08 RX ADMIN — FAMOTIDINE 20 MG: 20 TABLET, FILM COATED ORAL at 13:35

## 2017-12-08 RX ADMIN — LIDOCAINE HYDROCHLORIDE 0.1 ML: 10 INJECTION, SOLUTION INFILTRATION; PERINEURAL at 13:36

## 2017-12-08 RX ADMIN — ONDANSETRON 4 MG: 2 INJECTION INTRAMUSCULAR; INTRAVENOUS at 16:23

## 2017-12-08 RX ADMIN — FENTANYL CITRATE 50 MCG: 50 INJECTION, SOLUTION INTRAMUSCULAR; INTRAVENOUS at 16:13

## 2017-12-08 RX ADMIN — SODIUM CHLORIDE, SODIUM LACTATE, POTASSIUM CHLORIDE, AND CALCIUM CHLORIDE 75 ML/HR: 600; 310; 30; 20 INJECTION, SOLUTION INTRAVENOUS at 13:36

## 2017-12-08 RX ADMIN — DEXAMETHASONE SODIUM PHOSPHATE 5 MG: 4 INJECTION, SOLUTION INTRA-ARTICULAR; INTRALESIONAL; INTRAMUSCULAR; INTRAVENOUS; SOFT TISSUE at 16:18

## 2017-12-08 RX ADMIN — CEFAZOLIN 2 G: 10 INJECTION, POWDER, FOR SOLUTION INTRAVENOUS; PARENTERAL at 16:05

## 2017-12-08 RX ADMIN — SUCCINYLCHOLINE CHLORIDE 160 MG: 20 INJECTION INTRAMUSCULAR; INTRAVENOUS at 16:03

## 2017-12-08 RX ADMIN — HEPARIN SODIUM 5000 UNITS: 5000 INJECTION, SOLUTION INTRAVENOUS; SUBCUTANEOUS at 13:44

## 2017-12-08 RX ADMIN — LIDOCAINE HYDROCHLORIDE 100 MG: 20 INJECTION, SOLUTION EPIDURAL; INFILTRATION; INTRACAUDAL; PERINEURAL at 16:03

## 2017-12-08 RX ADMIN — ACETAMINOPHEN 1000 MG: 500 TABLET, FILM COATED ORAL at 15:54

## 2017-12-08 RX ADMIN — PROPOFOL 200 MG: 10 INJECTION, EMULSION INTRAVENOUS at 16:03

## 2017-12-08 RX ADMIN — MIDAZOLAM HYDROCHLORIDE 2 MG: 1 INJECTION, SOLUTION INTRAMUSCULAR; INTRAVENOUS at 15:02

## 2017-12-08 RX ADMIN — FENTANYL CITRATE 50 MCG: 50 INJECTION, SOLUTION INTRAMUSCULAR; INTRAVENOUS at 16:05

## 2017-12-08 NOTE — ANESTHESIA POSTPROCEDURE EVALUATION
Post-Anesthesia Evaluation and Assessment    Patient: Reinier Patricio MRN: [id number]  SSN: [id number]    YOB: 1970  Age: 49 y.o. Sex: male       Cardiovascular Function/Vital Signs  Visit Vitals    BP (!) 135/96 (BP 1 Location: Left arm, BP Patient Position: At rest)    Pulse 79    Temp 36.3 °C (97.3 °F)    Resp 14    Wt 88.5 kg (195 lb)    SpO2 100%    BMI 29.65 kg/m2       Patient is status post No value filed. anesthesia for Procedure(s):  RECTAL EXAM UNDER ANESTHESIA (EUA) WITH SETON PLACEMENT. Nausea/Vomiting: None    Postoperative hydration reviewed and adequate. Pain:  Pain Scale 1: Numeric (0 - 10) (12/08/17 1755)  Pain Intensity 1: 0 (12/08/17 1755)   Managed    Neurological Status:   Neuro (WDL): Within Defined Limits (12/08/17 1742)  Neuro  Neurologic State: Alert (12/08/17 1742)  Cognition: Appropriate decision making; Appropriate safety awareness (12/08/17 1742)  LUE Motor Response: Purposeful (12/08/17 1742)  LLE Motor Response: Purposeful (12/08/17 1742)  RUE Motor Response: Purposeful (12/08/17 1742)  RLE Motor Response: Purposeful (12/08/17 1742)   At baseline    Mental Status and Level of Consciousness: Arousable    Pulmonary Status:   O2 Device: Room air (12/08/17 1755)   Adequate oxygenation and airway patent    Complications related to anesthesia: None    Post-anesthesia assessment completed.  No concerns    Signed By: Cullen Christensen MD     December 8, 2017

## 2017-12-08 NOTE — H&P (VIEW-ONLY)
Pritesh Deleon MD  [facility]  [facility], [facility] [address], [place]   [phone]      Note  11/14/2017    Jose Montague  MRN: [id number]    Requesting Provider:      Primary Care Physician: Agueda Abraham MD    Reason for Consult: perianal abscess. HISTORY OF PRESENT ILLNESS:   Jose Montague is a 49y.o. year old male presents with perianal pain. He described increasing pain and some chronic drainage. He is unsure if he has had hemorrhoids in the past.  He doesn't describe any significant diarrhea or constipation. He had a rectal abscess drained in the spring and still has some drainage from it. He feels fatigued with lots of low back pain chronically since stopping pain meds. He denies any fevers or chills, nausea or vomiting, diarrhea or constipation. The patient complains of perianal itching and pain that has lasted for months. The severity is rated a 2/10 and has not improved with the use of conservative methods. The patient desires evaluation of their perianal symptoms and for me to formulate a treatment plan. REVIEW OF SYSTEMS:   Constitutional: No fevers/chills, no weakness, no fatigue, no lightheadedness, no night sweats, no insomnia, no appetite changes, no weight changes, no memory problems. Respiratory: No cough, no dyspnea, no wheezing, no hemoptysis, no sleep apnea   Cardiovascular: No chest pains, no chest pressure, no chest tightness, no palpitations   Gastrointestinal: benign.  Otherwise per HPI   Genitourinary: No dysuria, no hematuria, no urinary frequency, no nocturia, no recent UTIs   Musculoskeletal: No back/neck pain, no arthritis, no joint pain/swelling, no muscle pains   Skin: No rashes, no itching, no ulcers   Hematologic:  No easy bruising, no anemia             PAST MEDICAL HISTORY:     Past Medical History:   Diagnosis Date    Arthritis     Depression     Hepatitis C     History of drug abuse     Infectious disease     Hep C       PAST SURGICAL HISTORY:     Past Surgical History:   Procedure Laterality Date    HX HERNIA REPAIR  1996/2011    right inguinal/ left inguinal       ALLERGIES: No Known Allergies    HOME MEDICATIONS:   Current Outpatient Prescriptions   Medication Sig    gabapentin (NEURONTIN) 300 mg capsule Take 1 Cap by mouth three (3) times daily.  meloxicam (MOBIC) 15 mg tablet Take 1 Tab by mouth daily. No current facility-administered medications for this visit. SOCIAL HISTORY:    Social History     Social History    Marital status:      Spouse name: N/A    Number of children: N/A    Years of education: N/A     Occupational History    Not on file. Social History Main Topics    Smoking status: Current Every Day Smoker     Packs/day: 1.70     Years: 20.00     Types: Cigarettes    Smokeless tobacco: Never Used    Alcohol use No    Drug use: Yes     Special: Opiates      Comment: History of    Sexual activity: Not on file     Other Topics Concern    Not on file     Social History Narrative       PREVENTION: colonoscopy discussion and history. none    FAMILY HISTORY: see below breast, prostate, ovary, endometrial, gastric, or pancreatic cancers. Family History   Problem Relation Age of Onset    Psychiatric Disorder Mother     Cancer Mother      leukemia    Alcohol abuse Father      Drug abuse       PHYSICAL EXAM:  Height 5' 7\" (1.702 m), weight 199 lb (90.3 kg). Body mass index is 31.17 kg/(m^2). Female patient's are evaluated in the presence of a medical assistant or nurse or at the request of a male patient.   General: Normotensive, in no acute distress, well developed, well nourished appearing   Head:  AT/NC, no lesions   Eyes:  PERRLA, EOM's full, conjunctivae clear   Neck:  Supple, no masses, no lymphadenopathy, no thyromegaly, no carotid bruits   Chest:  Lungs clear, no rales, no rhonchi, no wheezes   Heart:  RR, no murmurs, no rubs, no gallops     Abdomen:  Soft, no tenderness, no rebound, no guarding, no masses, nondistended. Rectal:  FRENCH and anoscopy anal sinus consistent with a perianal fistula. Anal skin tags   Back:  Normal curvature, no tenderness. Extremities:  FROM, no deformities, no edema, no erythema   Neuro:  Physiologic, oriented x3, full affect, no localizing findings   Skin:  Normal, no rashes, no lesions noted. IMAGING: Reviewed tests. none      ASSESSMENT:  The clinical history, physical exam and tests are consistent with diagnosis of perianal pain and drainage. Perianal fistula likely. I have gone over the risks and benefits of conservative management vs. Surgical intervention. After a lengthy discussion in which I gave the patient sufficient time to ask questions to their satisfaction they desire to proceed with examination under examination. I explained the possible outcomes which include seton placement or fistulotomy and hemorrhoidectomy if needed. This is a 45 minute visit for the evaluation of the above diagnosis and greater than 50% of the visit is spent in face to face conversation with the patient to go over the risks and benefits of the chosen treatment and answer his questions to his satisfaction. Ronald Finney RECOMMENDATIONS:  EUA, seton or fistulotomy,  Hemorrhoidectomy if necessary. He understands the pathway and agrees with it. He wishes to schedule surgery soon. Please fax a copy to MD Kina Rogers.  Glory Babcock MD, FACS    11/14/2017

## 2017-12-08 NOTE — DISCHARGE INSTRUCTIONS
Physician Specific Discharge Instructions:    - In office in 3 to 5 days to begin teaching about his seton. - He will likely need referral to colorectal surgery due to the depth of the fistula. - Call with fevers or chills.    - Use sitzs bathes for pain control as well.    - DO NOT manipulate the Seton (string)  - Use non medicated baby wipes to clean rectal area.     Camilo Nova MD

## 2017-12-08 NOTE — IP AVS SNAPSHOT
Sarah Lou 
 
 
 [facility] [address] [address] 
[phone] Patient: Bailey Ca MRN: [id number] :1970 About your hospitalization You were admitted on:  2017 You last received care in the:  [facility] PACU You were discharged on:  2017 Why you were hospitalized Your primary diagnosis was:  Not on File Things You Need To Do (next 8 weeks) Follow up with Pastor Cooper MD  
  
Phone:  [phone] Where:  [address] [facility], [address] [facility] [address] Follow up with Cayden Govea MD  
Follow up in 3 to 5 days; please call office for appointment. Phone:  [phone] Where:  [address] [facility], [address] [place] [address] Wednesday Dec 20, 2017 Global Post Op with Cayden Govea MD at  8:50 AM  
Where:  [address] [facility] ([address] [facility]) Discharge Orders None A check john indicates which time of day the medication should be taken. My Medications TAKE these medications as instructed Instructions Each Dose to Equal  
 Morning Noon Evening Bedtime  
 gabapentin 300 mg capsule Commonly known as:  NEURONTIN Your last dose was: Your next dose is: Take 1 Cap by mouth three (3) times daily. 300 mg  
    
   
   
   
  
 meloxicam 15 mg tablet Commonly known as:  MOBIC Your last dose was: Your next dose is: Take 1 Tab by mouth daily. 15 mg  
    
   
   
   
  
 oxyCODONE-acetaminophen 5-325 mg per tablet Commonly known as:  PERCOCET Your last dose was: Your next dose is: Take 1 Tab by mouth every four (4) hours as needed. Max Daily Amount: 6 Tabs. 1 Tab Where to Get Your Medications Information on where to get these meds will be given to you by the nurse or doctor. ! Ask your nurse or doctor about these medications  
  oxyCODONE-acetaminophen 5-325 mg per tablet Discharge Instructions Physician Specific Discharge Instructions: - In office in 3 to 5 days to begin teaching about his seton. - He will likely need referral to colorectal surgery due to the depth of the fistula. - Call with fevers or chills.   
- Use sitzs bathes for pain control as well.   
- DO NOT manipulate the Seton (string) - Use non medicated baby wipes to clean rectal area. Xavi Covington MD 
 
  
  
  
[facility] & HE[facility]! Dear Lexi Pacheco: 
Thank you for requesting a [employer] account. Our records indicate that you already have an active [employer] account. You can access your account anytime at [email or web address] Did you know that you can access your hospital and ER discharge instructions at any time in [employer]? You can also review all of your test results from your hospital stay or ER visit. Additional Information If you have questions, please visit the Frequently Asked Questions section of the [employer] website at [email or web address] Remember, [employer] is NOT to be used for urgent needs. For medical emergencies, dial 911. Now available from your iPhone and Android! Providers Seen During Your Hospitalization Provider Specialty Primary office phone Xavi Covington MD General Surgery [phone] Your Primary Care Physician (PCP) Primary Care Physician Office Phone Office Fax Erik Rodriguez [phone] You are allergic to the following No active allergies Recent Documentation Weight BMI Smoking Status 88.5 kg 29.65 kg/m2 Current Every Day Smoker Emergency Contacts Name Discharge Info Relation Home Work Mobile Ruth Elizabeth  Spouse [3] [phone] Patient Belongings The following personal items are in your possession at time of discharge: 
  Dental Appliances: None Please provide this summary of care documentation to your next provider. Signatures-by signing, you are acknowledging that this After Visit Summary has been reviewed with you and you have received a copy. Patient Signature:  ____________________________________________________________ Date:  ____________________________________________________________  
  
[facility] Provider Signature:  ____________________________________________________________ Date:  ____________________________________________________________

## 2017-12-08 NOTE — ANESTHESIA PREPROCEDURE EVALUATION
Anesthetic History   No history of anesthetic complications            Review of Systems / Medical History  Patient summary reviewed, nursing notes reviewed and pertinent labs reviewed    Pulmonary          Smoker         Neuro/Psych         Psychiatric history     Cardiovascular                  Exercise tolerance: >4 METS     GI/Hepatic/Renal               Comments: Anal fistula Endo/Other        Arthritis     Other Findings   Comments: H/O substance abuse         Physical Exam    Airway  Mallampati: II  TM Distance: 4 - 6 cm  Neck ROM: normal range of motion   Mouth opening: Normal     Cardiovascular    Rhythm: regular           Dental  No notable dental hx       Pulmonary  Breath sounds clear to auscultation               Abdominal         Other Findings            Anesthetic Plan    ASA: 2  Anesthesia type: general          Induction: Intravenous  Anesthetic plan and risks discussed with: Patient

## 2017-12-08 NOTE — INTERVAL H&P NOTE
H&P Update:  Patricia Stark was seen and examined. History and physical has been reviewed. The patient has been examined.  There have been no significant clinical changes since the completion of the originally dated History and Physical.    Signed By: Richi Martins MD     December 8, 2017 12:45 PM

## 2017-12-21 PROBLEM — F33.9 RECURRENT DEPRESSION (HCC): Status: ACTIVE | Noted: 2017-12-21

## 2018-06-12 ENCOUNTER — HOSPITAL ENCOUNTER (EMERGENCY)
Age: 48
Discharge: HOME | End: 2018-06-12
Attending: EMERGENCY MEDICINE
Payer: COMMERCIAL

## 2018-06-12 VITALS
TEMPERATURE: 98.6 F | DIASTOLIC BLOOD PRESSURE: 99 MMHG | SYSTOLIC BLOOD PRESSURE: 123 MMHG | OXYGEN SATURATION: 100 % | HEIGHT: 69 IN | RESPIRATION RATE: 20 BRPM | WEIGHT: 204 LBS | BODY MASS INDEX: 30.21 KG/M2 | HEART RATE: 96 BPM

## 2018-06-12 DIAGNOSIS — L03.119 CELLULITIS OF UPPER EXTREMITY, UNSPECIFIED LATERALITY: Primary | ICD-10-CM

## 2018-06-12 PROCEDURE — 99283 EMERGENCY DEPT VISIT LOW MDM: CPT | Performed by: EMERGENCY MEDICINE

## 2018-06-12 PROCEDURE — 74011250637 HC RX REV CODE- 250/637: Performed by: EMERGENCY MEDICINE

## 2018-06-12 RX ORDER — SULFAMETHOXAZOLE AND TRIMETHOPRIM 800; 160 MG/1; MG/1
1 TABLET ORAL EVERY 12 HOURS
Status: DISCONTINUED | OUTPATIENT
Start: 2018-06-12 | End: 2018-06-12 | Stop reason: HOSPADM

## 2018-06-12 RX ORDER — SULFAMETHOXAZOLE AND TRIMETHOPRIM 800; 160 MG/1; MG/1
1 TABLET ORAL 2 TIMES DAILY
Qty: 20 TAB | Refills: 0 | Status: SHIPPED | OUTPATIENT
Start: 2018-06-12 | End: 2018-06-22

## 2018-06-12 RX ADMIN — SULFAMETHOXAZOLE AND TRIMETHOPRIM 1 TABLET: 800; 160 TABLET ORAL at 19:11

## 2018-06-12 NOTE — ED NOTES
I have reviewed discharge instructions with the patient. The patient verbalized understanding. Patient left ED via Discharge Method: ambulatory to Home with self. Opportunity for questions and clarification provided. Patient given 1 scripts. To continue your aftercare when you leave the hospital, you may receive an automated call from our care team to check in on how you are doing. This is a free service and part of our promise to provide the best care and service to meet your aftercare needs.  If you have questions, or wish to unsubscribe from this service please call [phone]. Thank you for Choosing our [employer] [facility] Emergency Department.

## 2018-06-12 NOTE — DISCHARGE INSTRUCTIONS
Cellulitis: Care Instructions  Your Care Instructions    Cellulitis is a skin infection. It often occurs after a break in the skin from a scrape, cut, bite, or puncture, or after a rash. The doctor has checked you carefully, but problems can develop later. If you notice any problems or new symptoms, get medical treatment right away. Follow-up care is a key part of your treatment and safety. Be sure to make and go to all appointments, and call your doctor if you are having problems. It's also a good idea to know your test results and keep a list of the medicines you take. How can you care for yourself at home? · Take your antibiotics as directed. Do not stop taking them just because you feel better. You need to take the full course of antibiotics. · Prop up the infected area on pillows to reduce pain and swelling. Try to keep the area above the level of your heart as often as you can. · If your doctor told you how to care for your wound, follow your doctor's instructions. If you did not get instructions, follow this general advice:  ¨ Wash the wound with clean water 2 times a day. Don't use hydrogen peroxide or alcohol, which can slow healing. ¨ You may cover the wound with a thin layer of petroleum jelly, such as Vaseline, and a nonstick bandage. ¨ Apply more petroleum jelly and replace the bandage as needed. · Be safe with medicines. Take pain medicines exactly as directed. ¨ If the doctor gave you a prescription medicine for pain, take it as prescribed. ¨ If you are not taking a prescription pain medicine, ask your doctor if you can take an over-the-counter medicine. To prevent cellulitis in the future  · Try to prevent cuts, scrapes, or other injuries to your skin. Cellulitis most often occurs where there is a break in the skin. · If you get a scrape, cut, mild burn, or bite, wash the wound with clean water as soon as you can to help avoid infection.  Don't use hydrogen peroxide or alcohol, which can slow healing. · If you have swelling in your legs (edema), support stockings and good skin care may help prevent leg sores and cellulitis. · Take care of your feet, especially if you have diabetes or other conditions that increase the risk of infection. Wear shoes and socks. Do not go barefoot. If you have athlete's foot or other skin problems on your feet, talk to your doctor about how to treat them. When should you call for help? Call your doctor now or seek immediate medical care if:  ? · You have signs that your infection is getting worse, such as:  ¨ Increased pain, swelling, warmth, or redness. ¨ Red streaks leading from the area. ¨ Pus draining from the area. ¨ A fever. ? · You get a rash. ? Watch closely for changes in your health, and be sure to contact your doctor if:  ? · You are not getting better after 1 day (24 hours). ? · You do not get better as expected. Where can you learn more? Go to http://tessie-georgina.info/. Tor Simms in the search box to learn more about \"Cellulitis: Care Instructions. \"  Current as of: October 13, 2016  Content Version: 11.4  © 7380-2032 [employer]. Care instructions adapted under license by [employer] (which disclaims liability or warranty for this information). If you have questions about a medical condition or this instruction, always ask your healthcare professional. [place] [address] any warranty or liability for your use of this information. Skin Abscess: Care Instructions  Your Care Instructions    A skin abscess is a bacterial infection that forms a pocket of pus. A boil is a kind of skin abscess. The doctor may have cut an opening in the abscess so that the pus can drain out. You may have gauze in the cut so that the abscess will stay open and keep draining. You may need antibiotics. You will need to follow up with your doctor to make sure the infection has gone away.   The doctor has checked you carefully, but problems can develop later. If you notice any problems or new symptoms, get medical treatment right away. Follow-up care is a key part of your treatment and safety. Be sure to make and go to all appointments, and call your doctor if you are having problems. It's also a good idea to know your test results and keep a list of the medicines you take. How can you care for yourself at home? · Apply warm and dry compresses, a heating pad set on low, or a hot water bottle 3 or 4 times a day for pain. Keep a cloth between the heat source and your skin. · If your doctor prescribed antibiotics, take them as directed. Do not stop taking them just because you feel better. You need to take the full course of antibiotics. · Take pain medicines exactly as directed. ¨ If the doctor gave you a prescription medicine for pain, take it as prescribed. ¨ If you are not taking a prescription pain medicine, ask your doctor if you can take an over-the-counter medicine. · Keep your bandage clean and dry. Change the bandage whenever it gets wet or dirty, or at least one time a day. · If the abscess was packed with gauze:  ¨ Keep follow-up appointments to have the gauze changed or removed. If the doctor instructed you to remove the gauze, gently pull out all of the gauze when your doctor tells you to. ¨ After the gauze is removed, soak the area in warm water for 15 to 20 minutes 2 times a day, until the wound closes. When should you call for help? Call your doctor now or seek immediate medical care if:  ? · You have signs of worsening infection, such as:  ¨ Increased pain, swelling, warmth, or redness. ¨ Red streaks leading from the infected skin. ¨ Pus draining from the wound. ¨ A fever. ? Watch closely for changes in your health, and be sure to contact your doctor if:  ? · You do not get better as expected. Where can you learn more? Go to http://tessie-georgian.info/.   Enter J678 in the search box to learn more about \"Skin Abscess: Care Instructions. \"  Current as of: October 13, 2016  Content Version: 11.4  © 4522-0023 [employer], [employer]. Care instructions adapted under license by [employer] (which disclaims liability or warranty for this information). If you have questions about a medical condition or this instruction, always ask your healthcare professional. [place] [address] any warranty or liability for your use of this information.

## 2018-06-12 NOTE — ED PROVIDER NOTES
HPI Comments: Patient is an intravenous drug abuser, has been injecting heroin into the hands and antecubital fossa is in complaints of redness and pain and swelling. Patient presented to [facility] for detox, but told he had to go to the doctor first to have his infections in his hands treated. Patient is a 51 y.o. male presenting with hand pain. The history is provided by the patient. Hand Pain    This is a new problem. The current episode started more than 2 days ago. The problem occurs constantly. The problem has been gradually worsening. Pain location: bilateral hands and forearms. The quality of the pain is described as constant and aching. The pain is at a severity of 8/10. Associated symptoms include limited range of motion and stiffness. Pertinent negatives include no numbness, no tingling, no itching, no back pain and no neck pain. The symptoms are aggravated by palpation and movement. He has tried nothing for the symptoms. The treatment provided no relief. Past Medical History:   Diagnosis Date    Anal fistula     Arthritis     OA     Current smoker     Depression     Hepatitis C     History of drug abuse     Infectious disease     Hep C    Sciatic nerve pain     Snores        Past Surgical History:   Procedure Laterality Date    HX HERNIA REPAIR  1996/2011    right inguinal/ left inguinal    HX OTHER SURGICAL  12/08/2017    anal fistula         Family History:   Problem Relation Age of Onset    Psychiatric Disorder Mother     Cancer Mother      leukemia    Alcohol abuse Father      Drug abuse       Social History     Social History    Marital status:      Spouse name: N/A    Number of children: N/A    Years of education: N/A     Occupational History    Not on file.      Social History Main Topics    Smoking status: Current Every Day Smoker     Packs/day: 1.70     Years: 20.00     Types: Cigarettes    Smokeless tobacco: Never Used    Alcohol use No    Drug use: Yes     Special: Opiates, Heroin      Comment: History of    Sexual activity: Not on file     Other Topics Concern    Not on file     Social History Narrative         ALLERGIES: Review of patient's allergies indicates no known allergies. Review of Systems   Constitutional: Negative for chills and fever. Musculoskeletal: Positive for arthralgias and stiffness. Negative for back pain and neck pain. Skin: Positive for color change. Negative for itching. Neurological: Negative for tingling and numbness. All other systems reviewed and are negative. Vitals:    06/12/18 1858   BP: (!) 123/99   Pulse: 96   Resp: 20   Temp: 98.6 °F (37 °C)   SpO2: 100%   Weight: 92.5 kg (204 lb)   Height: 5' 9\" (1.753 m)            Physical Exam   Constitutional: He is oriented to person, place, and time. He appears well-developed and well-nourished. No distress. HENT:   Head: Normocephalic and atraumatic. Right Ear: Tympanic membrane and external ear normal.   Left Ear: Tympanic membrane and external ear normal.   Mouth/Throat: Oropharynx is clear and moist.   Eyes: Conjunctivae and EOM are normal. Pupils are equal, round, and reactive to light. Neck: Normal range of motion. Neck supple. No tracheal deviation present. Cardiovascular: Normal rate, regular rhythm, normal heart sounds and intact distal pulses. Exam reveals no gallop and no friction rub. No murmur heard. Pulmonary/Chest: Effort normal and breath sounds normal. No respiratory distress. He has no wheezes. Musculoskeletal: He exhibits tenderness. He exhibits no edema. Lymphadenopathy:     He has no cervical adenopathy. Neurological: He is alert and oriented to person, place, and time. No cranial nerve deficit. Skin: Skin is warm and dry. No rash noted. He is not diaphoretic. There is erythema (dorsum of bilateral hands with mild swelling, as well as scattered areas to the forearms. ). Psychiatric: He has a normal mood and affect.    Nursing note and vitals reviewed.        MDM  Number of Diagnoses or Management Options  Cellulitis of upper extremity, unspecified laterality: new and does not require workup     Amount and/or Complexity of Data Reviewed  Review and summarize past medical records: yes    Risk of Complications, Morbidity, and/or Mortality  Presenting problems: low  Diagnostic procedures: minimal  Management options: low    Patient Progress  Patient progress: stable        ED Course       Procedures

## 2018-06-12 NOTE — ED TRIAGE NOTES
Patient states he has been injecting heroin, has infection and abscesses from the practice.  He was attempting to get into detox and was told they could not treat the infection

## 2018-07-24 ENCOUNTER — HOSPITAL ENCOUNTER (OUTPATIENT)
Dept: PHYSICAL THERAPY | Age: 48
Discharge: HOME | End: 2018-07-24
Attending: FAMILY MEDICINE
Payer: COMMERCIAL

## 2018-07-24 DIAGNOSIS — M54.42 ACUTE LEFT-SIDED LOW BACK PAIN WITH LEFT-SIDED SCIATICA: ICD-10-CM

## 2018-07-24 PROCEDURE — 97110 THERAPEUTIC EXERCISES: CPT

## 2018-07-24 PROCEDURE — 97162 PT EVAL MOD COMPLEX 30 MIN: CPT

## 2018-07-24 NOTE — THERAPY EVALUATION
Kaur Horton  : 1970      Payor: Venancio Woodward / Plan: [place] [employer] [employer] BLUE [employer]ENTIALS JERONIMO / Product Type: JERONIMO /  [address]. [address] [facility] [address], [place], [id number] [place] [address] Road  [phone]   [phone]       OUTPATIENT PHYSICAL THERAPY:Initial Assessment 2018    ICD-10: Treatment Diagnosis:    Low back pain (M54.5)           Sciatica, left side (M54.32)        Lumbago with sciatica, left side (M54.42)       Precautions/Allergies:   Review of patient's allergies indicates no known allergies. Fall Risk Score: 6 (? 5 = High Risk)  MD Orders: Eval and Treat  MEDICAL/REFERRING DIAGNOSIS:  Acute left-sided low back pain with left-sided sciatica [M54.42]   DATE OF ONSET: 2018  REFERRING PHYSICIAN: Rodrigo Leroy MD  RETURN PHYSICIAN APPOINTMENT: to be determined     INITIAL ASSESSMENT:  Mr. Kaur Horton presents to physical therapy with decreased postural and hip/core strength, ROM, joint mobility, flexibility, functional mobility, and increased pain. No pelvic malalignment upon initial evaluation but decreased posture. These S/S are consistent with referring diagnosis. Kaur Horton will benefit from skilled physical therapy (medically necessary) to address above deficits affecting participation in basic ADLs and functional mobility/tolerance. Patient will benefit from manual therapeutic techniques (stretching, joint mobilizations, soft tissue mobilization/myofascial release), therapeutic exercises and activities, postural strengthening/education, and comprehensive home exercises program to address current impairments and functional limitations. PROBLEM LIST (Impacting functional limitations):  1. Decreased Strength  2. Decreased ADL/Functional Activitie  3. Decreased Ambulation Ability  4. Decreased Balance  5. Increased Pain  6. Decreased Activity Tolerance    7. Decreased Flexibility/Joint Mobility  8.  Decreased [place] with Home Exercise Program INTERVENTIONS PLANNED:  1. Balance Exercises  2. Home Exercise Program (HEP)  3. Manual Therapy  4. Neuromuscular Re-education/Strengthening  5. Range of Motion (ROM)  6. Therapeutic Activites  7. Therapeutic Exercise/Strengthening    8. Lumbar Traction   TREATMENT PLAN:  Effective Dates: 7/24/18 TO 8/23/2018 (30 days). Frequency/Duration: 2 times a week for 30 Days  GOALS: (Goals have been discussed and agreed upon with patient.)  Short Term Goals 4 weeks   1. Kristina Valentino will be independent with HEP  2. Kristina Valentino will participate in LE stretching program to increase flexibility  3. Kristina Valentino will participate in core stabilization exercises to help with stabilization during ADLs  4. Kristina Valentino will participate in LE strengthening program with weights/resistance as appropriate to help with gait and elevations  Kristina Valentino  5. Kristina Valentino will tolerate manual therapy/joint mobilizations/soft tissue to increase ROM and decrease pain  6. Long Term Goals 8 weeks   1. Kristina Valentino will demonstrate a 10 point improvement on the Oswestry to show improvement in function  2. Kristina Valentino will report 0/10 pain at rest and during ADLs  3. Kristina Valentino will demonstrate 5/5 LE strength on manual muscle testing  4. Kristina Valentino will be able to perform SLS >5 seconds bilaterally to help with gait and improve balance  Rehabilitation Potential For Stated Goals: Good  Regarding Angela Elizabeth's therapy, I certify that the treatment plan above will be carried out by a therapist or under their direction. Thank you for this referral,  RT Rico     Referring Physician Signature: Michael Paulson MD              Date                    HISTORY:   History of Present Injury/Illness (Reason for Referral):  Patient states that he has had intermittent low back pain for about 3-4 years. States that the pain started to get much worse about a month ago.  States that there was no injury. Pain started on the left side of his back and now he has left sciatica. He has taken two steroid dose paks over the last month without effect. Does not report specific muscle weakness.    -Present symptoms/complaints (on day of evaluation) Patient complaints of left sciatica, posterior leg pain and numbness in his left toes. Pain Scale:  · Current: 7/10  · Best: 3/10  · Worst: 10/10      · Aggravating factors: sitting, driving, bending and lifting. · Relieving factors: rest      Past Medical History/Comorbidities:   Mr. Shiraz Bella  has a past medical history of Anal fistula; Arthritis; Current smoker; Depression; Hepatitis C; History of drug abuse; Infectious disease; Sciatic nerve pain; and Snores. Mr. Shiraz Bella  has a past surgical history that includes hx hernia repair (0584/4148) and hx other surgical (12/08/2017). Social History/Living Environment:        Social History     Social History    Marital status:      Spouse name: N/A    Number of children: N/A    Years of education: N/A     Occupational History    Not on file. Social History Main Topics    Smoking status: Current Every Day Smoker     Packs/day: 1.70     Years: 20.00     Types: Cigarettes    Smokeless tobacco: Never Used    Alcohol use No    Drug use: Yes     Special: Opiates, Heroin      Comment: History of    Sexual activity: Not on file     Other Topics Concern    Not on file     Social History Narrative     Prior Level of Function/Work/Activity:  Patient works full time as a dry wall contractor.   Previous Treatment Approach  medication  Dominant Side right  Other Clinical Tests  none  Active Ambulatory Problems     Diagnosis Date Noted    History of drug abuse     Hepatitis C     Recurrent depression ([facility] [address] 12/21/2017     Resolved Ambulatory Problems     Diagnosis Date Noted    No Resolved Ambulatory Problems     Past Medical History:   Diagnosis Date    Anal fistula     Arthritis     Current smoker     Depression     Hepatitis C     History of drug abuse     Infectious disease     Sciatic nerve pain     Snores      Note: Patient denies any increase of symptoms with cough, sneeze or valsalva. Patient denies any saddle paresthesia or bowel/bladder deficits. Current Medications:    Current Outpatient Prescriptions:     methylPREDNISolone (MEDROL DOSEPACK) 4 mg tablet, Take as directed, Disp: 1 Dose Pack, Rfl: 0    diclofenac EC (VOLTAREN) 75 mg EC tablet, Take 1 Tab by mouth two (2) times a day., Disp: 60 Tab, Rfl: 0    gabapentin (NEURONTIN) 300 mg capsule, Take 1 Cap by mouth three (3) times daily. , Disp: 60 Cap, Rfl: 1     Date Last Reviewed:  7/24/2018   Number of Personal Factors/Comorbidities that affect the Plan of Care: 1-2: MODERATE COMPLEXITY   EXAMINATION:   Observation/Orthostatic Postural Assessment:          Decreased lordosis   Palpation:          Muscle guarding and pain with light palpation lumbar paravertebrals, pain and wincing P/A glides lumbar spine thoughout.   ROM:            AROM/PROM         Joint: Eval Date: 7/24/18  Re-Assess Date:  Re-Assess Date:    Active ROM RIGHT LEFT RIGHT LEFT RIGHT LEFT   Knee Extension         Knee Flexion         Hip Flexion         Hip Abduction         Lumbar Flexion 45 pain down leg        Lumbar Extension 10        Lumbar Side-bending 15  10        Lumbar Rotation           Strength:     Eval Date:  Re-Assess Date:  Re-Assess Date:      RIGHT LEFT RIGHT LEFT RIGHT LEFT   Knee Flexion (L5-S2) 5/5 3+/5       Knee Extension (L3, L4) 5/5 3+/5       Hip Flexion (L1, L2) 5/5 4/5       Hip Extension 3+/5 3+/520       Hip Abduction (L5, S1) 3+/5 3+/5       Ankle Dorsiflexion  5/5 5/5       Great Toe Extension (L5) 5 4       Ankle Plantar Flexion (S1-S2) 5 5           Single leg stance right/left: 15 sec/ 5 sec excessive trunk sway              Deep squat: full    Ham 90/90:   RIGHT LE: 20   LEFT LE: 45    Special Tests:   · IRINEO negative  · SLR (<60 degrees)= right 90, left 50 muscle guarding, posterior thigh pain. · SLUMP= lumbar pain    Joint Mobility Eval Date:  Re-Assess Date:  Re-Assess Date:     RIGHT LEFT RIGHT LEFT RIGHT LEFT   Lumbar Limited assessment due to pain complaints and muscle guarding        Sacroiliac         Hip WNL WNL       Thoracic Hypomobility into ext          Neurological Screen:    RADIATING SYMPTOMS?: YES      · L3-4 (Post/Lat thigh and Ant tibial region)=   · L4-5 (Dorsum foot)=     Functional Mobility: Decreased Affecting participation in basic ADLs and functional tasks. Balance:     Single Leg Stance: R= 15/ L= 5   Structures involved:  1. Muscle  2. joints Body Functions Affected:  1. Sensory/Pain  2. Neuromusculoskeletal  3. Movement Related Activities and Participation Affected:  1. Mobility  2. Self Care   Number of elements that affect the Plan of Care: 3: MODERATE COMPLEXITY   CLINICAL PRESENTATION:   Presentation: Evolving clinical presentation with changing clinical characteristics: MODERATE COMPLEXITY   CLINICAL DECISION MAKING:   Outcome Measure: Tool Used: Modified Oswestry Low Back Pain Questionnaire  Score:  Initial: 26/50  Most Recent: X/50 (Date: -- )   Interpretation of Score: Each section is scored on a 0-5 scale, 5 representing the greatest disability. The scores of each section are added together for a total score of 50. Score 0 1-10 11-20 21-30 31-40 41-49 50   Modifier CH CI CJ CK CL CM CN     ? Mobility - Walking and Moving Around:     - CURRENT STATUS: CK - 40%-59% impaired, limited or restricted    - GOAL STATUS: CI - 1%-19% impaired, limited or restricted    - D/C STATUS:  ---------------To be determined---------------    Medical Necessity:   · Skilled intervention continues to be required due to above deficits affecting participation in basic ADLs and overall functional tolerance.   Reason for Services/Other Comments:  · Patient continues to require skilled intervention due to  above deficits affecting participation in basic ADLs and overall functional tolerance. Use of outcome tool(s) and clinical judgement create a POC that gives a: Questionable prediction of patient's progress: MODERATE COMPLEXITY   TREATMENT:   (In addition to Assessment/Re-Assessment sessions the following treatments were rendered)    · Pre-Treatment Pain/ Symptoms: See above report in Initial Assessment   7/10     THERAPEUTIC EXERCISE: (20 minutes):  Exercises per grid below to improve mobility, strength and balance. Required minimal visual and verbal cues to promote proper body alignment and promote proper body posture. Progressed resistance, range and complexity of movement as indicated. Date:  7/24/18 Date:   Date:     Activity/Exercise Parameters Parameters Parameters   Patient education; Pathology, ex rational, activity modification, HEP     Prone on elbows 3 min     Prone pressup 10 x     Quadriped hip eugenia 10 x     Cat camel 10 x                   MedBridge Portal      Therapeutic Activity: (0 minutes): Activity Date:   Date:   Date:     Exercise Parameters Parameters Parameters                                                   MANUAL THERAPY: (0 minutes): Joint mobilization, Soft tissue mobilization and Manipulation was utilized and necessary because of the patient's restricted joint motion, painful spasm, restricted motion of soft tissue. (Used abbreviations: MET - muscle energy technique; PNF - proprioceptive neuromuscular facilitation; NMR - neuromuscular re-education; AP - anterior to posterior; PA - posterior to anterior)               Manual Intervention Date:   Date:   Date:     Soft tissue mobilization Joint Mobility Parameters Parameters Parameters                                                      Modalities: ( minutes):   · . Treatment/Session Assessment:  Buddy Richardson verbalized understanding of role of PT and POC.     · Post Treatment Pain/ Symptoms: 3  3/10 ·   Compliance with Program/Exercises: Will assess as treatment progresses. · Recommendations/Intent for next treatment session: \"Next visit will focus on advancements to more challenging activities\".     Future Appointments  Date Time Provider [place]   7/30/2018 4:15 PM [facility] Conn[facility]   8/1/2018 4:15 PM Delilah Zhu[facility]       Total Treatment Duration:  PT Patient Time In/Time Out  Time In: 1330  Time Out: RT Gilberto

## 2018-11-21 ENCOUNTER — HOSPITAL ENCOUNTER (EMERGENCY)
Age: 48
Discharge: HOME | End: 2018-11-21
Attending: EMERGENCY MEDICINE
Payer: COMMERCIAL

## 2018-11-21 VITALS
BODY MASS INDEX: 30.21 KG/M2 | SYSTOLIC BLOOD PRESSURE: 142 MMHG | RESPIRATION RATE: 18 BRPM | OXYGEN SATURATION: 99 % | DIASTOLIC BLOOD PRESSURE: 76 MMHG | WEIGHT: 204 LBS | TEMPERATURE: 98.2 F | HEIGHT: 69 IN | HEART RATE: 101 BPM

## 2018-11-21 DIAGNOSIS — H20.00 ACUTE IRITIS: Primary | ICD-10-CM

## 2018-11-21 PROCEDURE — 99284 EMERGENCY DEPT VISIT MOD MDM: CPT | Performed by: EMERGENCY MEDICINE

## 2018-11-21 PROCEDURE — 74011000250 HC RX REV CODE- 250: Performed by: EMERGENCY MEDICINE

## 2018-11-21 PROCEDURE — 74011250637 HC RX REV CODE- 250/637: Performed by: EMERGENCY MEDICINE

## 2018-11-21 RX ORDER — TRAMADOL HYDROCHLORIDE 50 MG/1
50-100 TABLET ORAL
Qty: 20 TAB | Refills: 0 | Status: SHIPPED | OUTPATIENT
Start: 2018-11-21 | End: 2018-12-04

## 2018-11-21 RX ORDER — HYDROCODONE BITARTRATE AND ACETAMINOPHEN 10; 325 MG/1; MG/1
1 TABLET ORAL
Status: COMPLETED | OUTPATIENT
Start: 2018-11-21 | End: 2018-11-21

## 2018-11-21 RX ORDER — PREDNISOLONE ACETATE 10 MG/ML
1 SUSPENSION/ DROPS OPHTHALMIC
Status: COMPLETED | OUTPATIENT
Start: 2018-11-21 | End: 2018-11-21

## 2018-11-21 RX ORDER — CYCLOPENTOLATE HYDROCHLORIDE 10 MG/ML
3 SOLUTION/ DROPS OPHTHALMIC
Status: COMPLETED | OUTPATIENT
Start: 2018-11-21 | End: 2018-11-21

## 2018-11-21 RX ADMIN — HYDROCODONE BITARTRATE AND ACETAMINOPHEN 1 TABLET: 10; 325 TABLET ORAL at 20:10

## 2018-11-21 RX ADMIN — CYCLOPENTOLATE HYDROCHLORIDE 3 DROP: 10 SOLUTION/ DROPS OPHTHALMIC at 19:08

## 2018-11-21 RX ADMIN — PREDNISOLONE ACETATE 1 DROP: 10 SUSPENSION/ DROPS OPHTHALMIC at 19:35

## 2018-11-21 NOTE — ED TRIAGE NOTES
PMD-Dr Andrés Marie. Pt c/o right eye pain since last night. He was sent here from [facility]. States that eye was watery with some pain last night which has worsened over the day. Hx of iritis. Photosensitive.

## 2018-11-22 NOTE — DISCHARGE INSTRUCTIONS
Use one drop of pink topped prednisolone, every 6 hours  Use one drop of the red topped Cyclogyl, every 8 hours  Ultram as needed for pain  Also take 4 ibuprofen every 8 hours     Follow-up with Dr. Arlyn Higgins , early next week, call Monday morning for an appointment  Return to the ER sooner for sudden increase in eye pain, sudden additional decrease in vision, or other pressing concern           Iritis: Care Instructions  Your Care Instructions    Iritis is an inflammation of the colored part of the eye. This part of the eye is called the iris. Iritis can cause redness and pain. It can make your eyes more sensitive to light. And it may make your pupils different sizes. Iritis is most often treated with prescription eyedrops. Treatment can usually prevent long-term problems with vision. Iritis usually lasts 6 to 8 weeks. You will need follow-up care with an eye doctor (ophthalmologist). Anterior uveitis (say \"you-vee-EYE-tus\") and iridocyclitis (say \"etq-tp-igv-faraz-KLY-tus\") are other terms used to refer to this problem. Follow-up care is a key part of your treatment and safety. Be sure to make and go to all appointments, and call your doctor if you are having problems. It's also a good idea to know your test results and keep a list of the medicines you take. How can you care for yourself at home? · If the doctor gave you eyedrops, use them exactly as directed. Use the medicine for as long as instructed, even if your eye starts to look better soon. Call your doctor if you think you are having a problem with your eyedrops. Wash your hands well before and after you put in eyedrops. · To put in eyedrops or ointment:  ? Tilt your head back, and pull your lower eyelid down with one finger. ? Drop or squirt the medicine inside the lower lid. ? Close your eye for 30 to 60 seconds to let the drops or ointment move around. ? Do not touch the ointment or dropper tip to your eyelashes or any other surface.   · Take an over-the-counter pain medicine, such as acetaminophen (Tylenol), ibuprofen (Advil, Motrin), or naproxen (Aleve). Read and follow all instructions on the label. · Make sure you go to all of your follow-up appointments. You will need a complete eye exam from an eye doctor. When should you call for help? Call your doctor now or seek immediate medical care if:    · You have new or increasing eye pain.     · You have vision changes in either eye.    Watch closely for changes in your health, and be sure to contact your doctor if:    · You have new or worse symptoms.     · You do not get better as expected. Where can you learn more? Go to http://tessie-georgina.info/. Enter B112 in the search box to learn more about \"Iritis: Care Instructions. \"  Current as of: December 3, 2017  Content Version: 11.8  © 1211-8985 [employer]. Care instructions adapted under license by [employer] (which disclaims liability or warranty for this information). If you have questions about a medical condition or this instruction, always ask your healthcare professional. [place] [address] any warranty or liability for your use of this information. Uveitis: Care Instructions  Your Care Instructions    Uveitis (say \"you-vee-EYE-tus\") is swelling and tenderness of the middle layer of the eye. This area includes the colored part of the eye (iris), muscles, and blood vessels. One or both of your eyes may be swollen, red, and painful. You may have blurred vision. You may have gotten uveitis from an infection, but the cause is often not known. There are three types of uveitis. · Anterior uveitis is the most common type. This is pain and swelling of the front part of the eye. It is treated with eyedrops or ointment and usually lasts less than 6 weeks. · Intermediate uveitis affects the middle of the eye. It may be treated with eyedrops or with medicine given in a shot.  It usually lasts longer than 6 weeks. · Posterior uveitis is the least common type. It affects the back of the eye. This is usually treated with medicine. It can last from a few weeks to a few years. It is important to treat uveitis. Treatment can save your eyesight and avoid permanent damage to your eyes. Follow-up care is a key part of your treatment and safety. Be sure to make and go to all appointments, and call your doctor if you are having problems. It's also a good idea to know your test results and keep a list of the medicines you take. How can you care for yourself at home? · Take your medicines exactly as prescribed. Call your doctor if you have any problems with your medicine. You will get more details on the specific medicines your doctor prescribes. · Use any prescribed eyedrops or ointments exactly as your doctor told you to. · Keep the eyedropper or bottle tip clean. · If you are using eyedrops and an ointment, put in the eyedrops before you use the ointment. · To put in eyedrops or ointment:  ? Tilt your head back, and pull your lower eyelid down with one finger. ? Drop or squirt the medicine inside the lower lid. ? Close your eye for 30 to 60 seconds to let the drops or ointment move around. ? Do not touch the ointment or dropper tip to your eyelashes or any other surface. · Wear sunglasses if light hurts your eyes. · Do not wear contact lenses until your eyes have healed. · Do not wear eye makeup until your eyes have healed. · Do not drive if you have blurred vision. When should you call for help? Call your doctor now or seek immediate medical care if:    · You have signs of an eye infection, such as:  ? Pus or thick discharge coming from the eye.  ? Redness or swelling around the eye.  ?  A fever.     · You have new or worse eye pain.     · You have vision changes.     · It feels like there is something in your eye.     · Light hurts your eye.    Watch closely for changes in your health, and be sure to contact your doctor if:    · You do not get better as expected. Where can you learn more? Go to http://tessie-georgina.info/. Enter S818 in the search box to learn more about \"Uveitis: Care Instructions. \"  Current as of: December 3, 2017  Content Version: 11.8  © 6139-8631 [employer]. Care instructions adapted under license by [employer] (which disclaims liability or warranty for this information). If you have questions about a medical condition or this instruction, always ask your healthcare professional. [address] any warranty or liability for your use of this information.

## 2018-11-22 NOTE — ED NOTES
I have reviewed discharge instructions with the patient. The patient verbalized understanding. Patient left ED via Discharge Method: ambulatory to Home with family. Opportunity for questions and clarification provided. Patient given 1 scripts. To continue your aftercare when you leave the hospital, you may receive an automated call from our care team to check in on how you are doing. This is a free service and part of our promise to provide the best care and service to meet your aftercare needs.  If you have questions, or wish to unsubscribe from this service please call [phone]. Thank you for Choosing our [facility] Emergency Department.

## 2018-11-26 NOTE — ED PROVIDER NOTES
Chief complaint : eye pain HISTORY OF PRESENT ILLNESS : 
Location : right Quality : aching Quantity : constant Timing : onset this a.m., steadily worsening through the day Severity : moderate / severe Alleviating / exacerbating factors : light sensitive, lids hurt to touch Associated Symptoms :  
Denies FB sensation Slight blurriness on right No trauma, welding, grinding, yard work, etc. 
 
 
 
  
 
Past Medical History:  
Diagnosis Date  Anal fistula  Arthritis OA  Current smoker  Depression  Hepatitis C   
 History of drug abuse  Infectious disease Hep C  
 Sciatic nerve pain  Snores Past Surgical History:  
Procedure Laterality Date  HX HERNIA REPAIR  1996/2011  
 right inguinal/ left inguinal  
 HX OTHER SURGICAL  12/08/2017  
 anal fistula Family History:  
Problem Relation Age of Onset  Psychiatric Disorder Mother  Cancer Mother   
     leukemia  Alcohol abuse Father Drug abuse Social History Socioeconomic History  Marital status:  Spouse name: Not on file  Number of children: Not on file  Years of education: Not on file  Highest education level: Not on file Social Needs  Financial resource strain: Not on file  Food insecurity - worry: Not on file  Food insecurity - inability: Not on file  Transportation needs - medical: Not on file  Transportation needs - non-medical: Not on file Occupational History  Not on file Tobacco Use  Smoking status: Current Every Day Smoker Packs/day: 1.70 Years: 20.00 Pack years: 34.00 Types: Cigarettes  Smokeless tobacco: Never Used Substance and Sexual Activity  Alcohol use: No  
 Drug use: Yes Types: Opiates, Heroin Comment: History of  
 Sexual activity: Not on file Other Topics Concern  Not on file Social History Narrative  Not on file ALLERGIES: Patient has no known allergies. Review of Systems Constitutional: Negative for chills and fever. Eyes: Positive for photophobia, pain, redness and visual disturbance. Negative for discharge and itching. Skin: Negative for color change and rash. Neurological: Positive for headaches. Negative for dizziness and numbness. Vitals:  
 11/21/18 1808 11/21/18 1810 11/21/18 2010 BP: 140/78  142/76 Pulse: 89  (!) 101 Resp: 16  18 Temp: 98 °F (36.7 °C)  98.2 °F (36.8 °C) SpO2: 100%  99% Weight:  92.5 kg (204 lb) Height:  5' 9\" (1.753 m) Physical Exam  
Constitutional: He is oriented to person, place, and time. He appears well-developed and well-nourished. He appears distressed. Uncomfortable, prefers room lights dimmed HENT:  
Head: Normocephalic and atraumatic. Eyes: EOM are normal. Pupils are equal, round, and reactive to light. Right eye exhibits no discharge. Left eye exhibits no discharge. Right conjunctiva is injected. Right conjunctiva has no hemorrhage. No dye uptake on woods lamp, Little if any cell/flare seen on slit lamp Pressure on right is 11 Severe photophobia (even consensual) Va noted in vitals Neck: Normal range of motion. Neck supple. Pulmonary/Chest: Effort normal. No respiratory distress. Musculoskeletal: Normal range of motion. Neurological: He is alert and oriented to person, place, and time. He has normal strength. He exhibits normal muscle tone. cni 2-12 grossly Nl gait, Nl speech Skin: Skin is warm and dry. No rash noted. He is not diaphoretic. Psychiatric: He has a normal mood and affect. His behavior is normal.  
Nursing note and vitals reviewed. MDM Number of Diagnoses or Management Options Acute iritis:  
Diagnosis management comments: Medical decision making note: 
Eye pain 
 
Probable uveitis/iritis Vision good, pressure good Will dilate and add topical steroids D/w bettie who will see early this week, unless he worsens, then sooner This concludes the \"medical decision making note\" part of this emergency department visit note. Procedures

## 2018-12-04 ENCOUNTER — HOSPITAL ENCOUNTER (EMERGENCY)
Age: 48
Discharge: HOME | End: 2018-12-04
Attending: EMERGENCY MEDICINE
Payer: COMMERCIAL

## 2018-12-04 VITALS
WEIGHT: 200 LBS | TEMPERATURE: 98.4 F | BODY MASS INDEX: 29.62 KG/M2 | RESPIRATION RATE: 18 BRPM | SYSTOLIC BLOOD PRESSURE: 144 MMHG | DIASTOLIC BLOOD PRESSURE: 86 MMHG | HEIGHT: 69 IN | OXYGEN SATURATION: 97 % | HEART RATE: 77 BPM

## 2018-12-04 DIAGNOSIS — D36.7 DERMOID CYST OF LEFT UPPER EXTREMITY: Primary | ICD-10-CM

## 2018-12-04 PROCEDURE — 99283 EMERGENCY DEPT VISIT LOW MDM: CPT | Performed by: PHYSICIAN ASSISTANT

## 2018-12-04 PROCEDURE — 75810000289 HC I&D ABSCESS SIMP/COMP/MULT: Performed by: PHYSICIAN ASSISTANT

## 2018-12-04 RX ORDER — CLINDAMYCIN HYDROCHLORIDE 150 MG/1
150 CAPSULE ORAL 4 TIMES DAILY
Qty: 20 CAP | Refills: 0 | Status: SHIPPED | OUTPATIENT
Start: 2018-12-04 | End: 2019-05-16

## 2018-12-04 NOTE — ED NOTES
I have reviewed discharge instructions with the patient. The patient verbalized understanding. Patient left ED via Discharge Method: ambulatory to Home with self. Opportunity for questions and clarification provided. Patient given 1 scripts. To continue your aftercare when you leave the hospital, you may receive an automated call from our care team to check in on how you are doing. This is a free service and part of our promise to provide the best care and service to meet your aftercare needs.  If you have questions, or wish to unsubscribe from this service please call [phone]. Thank you for Choosing our [facility] Emergency Department.

## 2018-12-04 NOTE — DISCHARGE INSTRUCTIONS
Keep clean, use meds as directed, call office to arrange follow up appt, return to er if symptoms worsen before appt

## 2018-12-04 NOTE — ED TRIAGE NOTES
Pt in states cyst to left wrist for several weeks. States he has had one in the past and had it drained.

## 2018-12-04 NOTE — ED PROVIDER NOTES
The history is provided by the patient. Skin Problem This is a new problem. The current episode started more than 2 days ago. The problem has been gradually worsening. The problem is associated with an unknown factor. There has been no fever. The rash is present on the left wrist. The pain is at a severity of 6/10. The pain is mild. The pain has been constant since onset. Associated symptoms include pain. He has tried nothing for the symptoms. The treatment provided no relief. Past Medical History:  
Diagnosis Date  Anal fistula  Arthritis OA  Current smoker  Depression  Hepatitis C   
 History of drug abuse  Infectious disease Hep C  
 Sciatic nerve pain  Snores Past Surgical History:  
Procedure Laterality Date  HX HERNIA REPAIR  1996/2011  
 right inguinal/ left inguinal  
 HX OTHER SURGICAL  12/08/2017  
 anal fistula Family History:  
Problem Relation Age of Onset  Psychiatric Disorder Mother  Cancer Mother   
     leukemia  Alcohol abuse Father Drug abuse Social History Socioeconomic History  Marital status:  Spouse name: Not on file  Number of children: Not on file  Years of education: Not on file  Highest education level: Not on file Social Needs  Financial resource strain: Not on file  Food insecurity - worry: Not on file  Food insecurity - inability: Not on file  Transportation needs - medical: Not on file  Transportation needs - non-medical: Not on file Occupational History  Not on file Tobacco Use  Smoking status: Current Every Day Smoker Packs/day: 1.70 Years: 20.00 Pack years: 34.00 Types: Cigarettes  Smokeless tobacco: Never Used Substance and Sexual Activity  Alcohol use: No  
 Drug use: Yes Types: Opiates, Heroin Comment: History of  
 Sexual activity: Not on file Other Topics Concern  Not on file Social History Narrative  Not on file ALLERGIES: Patient has no known allergies. Review of Systems All other systems reviewed and are negative. Vitals:  
 12/04/18 1341 BP: 129/88 Pulse: 81 Resp: 18 Temp: 98.4 °F (36.9 °C) SpO2: 99% Weight: 90.7 kg (200 lb) Height: 5' 9\" (1.753 m) Physical Exam  
Constitutional: He is oriented to person, place, and time. He appears well-developed and well-nourished. No distress. HENT:  
Head: Normocephalic and atraumatic. Eyes: EOM are normal. Pupils are equal, round, and reactive to light. Neck: Normal range of motion. Neck supple. Cardiovascular: Normal rate and regular rhythm. Pulmonary/Chest: Effort normal and breath sounds normal.  
Abdominal: Soft. Bowel sounds are normal.  
Musculoskeletal: Normal range of motion. He exhibits edema and tenderness. Left wrist with red swollen tender area abut 4 cm no streaking, soft, not pulsatile Neurological: He is alert and oriented to person, place, and time. Skin: Skin is warm. He is not diaphoretic. Psychiatric: He has a normal mood and affect. Nursing note and vitals reviewed. MDM Number of Diagnoses or Management Options Diagnosis management comments: Likly cyst to left dorsal wrist, will refer to general surgery for further evaluation Will place on cleocin Risk of Complications, Morbidity, and/or Mortality Presenting problems: moderate Diagnostic procedures: moderate Management options: low Patient Progress Patient progress: improved I&D Abcess Simple Date/Time: 12/4/2018 2:37 PM 
Performed by: Luanne Carrel, PA Authorized by: Luanne Carrel, PA Consent:  
  Consent obtained:  Verbal 
  Consent given by:  Patient Risks discussed:  Incomplete drainage Alternatives discussed:  Alternative treatment Location:  
  Type:  Abscess Size:  4cm Location:  Upper extremity   Upper extremity location:  Wrist 
 Wrist location:  L wrist 
Pre-procedure details:  
  Skin preparation:  Betadine Anesthesia (see MAR for exact dosages): Anesthesia method:  Local infiltration Local anesthetic:  Lidocaine 1% w/o epi Procedure type:  
  Complexity:  Simple Procedure details:  
  Needle aspiration: no Incision types:  Single straight Incision depth:  Dermal 
  Scalpel blade:  11 Drainage:  Bloody Drainage amount:  Scant Wound treatment:  Wound left open Packing materials:  None Post-procedure details:  
  Patient tolerance of procedure: Tolerated well, no immediate complications

## 2018-12-04 NOTE — LETTER
[facility] EMERGENCY DEPT 
[address] [place] [id number] 
[phone] Work/School Note Date: 12/4/2018 To Whom It May concern: 
 
Keya Syed was seen and treated today in the emergency room by the following provider(s): 
Attending Provider: Narayan Olivarez MD 
Physician Assistant: Nilton Hence may return to work on 12-5-18. Sincerely, Davee Olszewski, PA

## 2019-05-17 ENCOUNTER — HOSPITAL ENCOUNTER (EMERGENCY)
Age: 49
Discharge: HOME | End: 2019-05-17
Attending: EMERGENCY MEDICINE
Payer: COMMERCIAL

## 2019-05-17 VITALS
WEIGHT: 222 LBS | RESPIRATION RATE: 16 BRPM | BODY MASS INDEX: 32.88 KG/M2 | HEART RATE: 98 BPM | DIASTOLIC BLOOD PRESSURE: 83 MMHG | OXYGEN SATURATION: 98 % | TEMPERATURE: 98.1 F | HEIGHT: 69 IN | SYSTOLIC BLOOD PRESSURE: 137 MMHG

## 2019-05-17 DIAGNOSIS — L02.91 ABSCESS: Primary | ICD-10-CM

## 2019-05-17 PROCEDURE — 75810000289 HC I&D ABSCESS SIMP/COMP/MULT: Performed by: PHYSICIAN ASSISTANT

## 2019-05-17 PROCEDURE — 99283 EMERGENCY DEPT VISIT LOW MDM: CPT | Performed by: PHYSICIAN ASSISTANT

## 2019-05-17 NOTE — ED TRIAGE NOTES
Presents with two abscesses on his right arm. Pt states it has happened before, usually gets around 1-2 per year. Pt states hx of IV drug use, but states he has been clean for 9 days. Denies fevers at home. Redness and streaking from [facility] to [facility]. No hx of sepsis from abscess, states he was seen yesterday and given bactrim, has had 2 doses since.

## 2019-05-17 NOTE — ED NOTES
Wound dressed with neosporin, gauze, and coban. I have reviewed discharge instructions with the patient. The patient verbalized understanding. Patient left ED via Discharge Method: ambulatory to Home with self. Opportunity for questions and clarification provided. Patient given 0 scripts. To continue your aftercare when you leave the hospital, you may receive an automated call from our care team to check in on how you are doing. This is a free service and part of our promise to provide the best care and service to meet your aftercare needs.  If you have questions, or wish to unsubscribe from this service please call [phone]. Thank you for Choosing our [facility] Emergency Department.

## 2019-05-17 NOTE — ED PROVIDER NOTES
Pt with abscess to rt forearm started yesterday, started bactrim yesterday,no fever, admits to iv drug use, The history is provided by the patient. Abscess This is a new problem. The current episode started yesterday. The problem has been gradually worsening. Associated with: iv drug use  There has been no fever. The rash is present on the right arm. The pain is at a severity of 10/10. The pain is moderate. The pain has been constant since onset. Associated symptoms include pain. Pertinent negatives include no weeping. He has tried antibiotic for the symptoms. The treatment provided no relief. Past Medical History:  
Diagnosis Date  Anal fistula  Arthritis OA  Current smoker  Depression  Hepatitis C   
 History of drug abuse  Infectious disease Hep C  
 Sciatic nerve pain  Snores Past Surgical History:  
Procedure Laterality Date  HX HERNIA REPAIR  1996/2011  
 right inguinal/ left inguinal  
 HX OTHER SURGICAL  12/08/2017  
 anal fistula Family History:  
Problem Relation Age of Onset  Psychiatric Disorder Mother  Cancer Mother   
     leukemia  Alcohol abuse Father Drug abuse Social History Socioeconomic History  Marital status:  Spouse name: Not on file  Number of children: Not on file  Years of education: Not on file  Highest education level: Not on file Occupational History  Not on file Social Needs  Financial resource strain: Not on file  Food insecurity:  
  Worry: Not on file Inability: Not on file  Transportation needs:  
  Medical: Not on file Non-medical: Not on file Tobacco Use  Smoking status: Current Every Day Smoker Packs/day: 1.70 Years: 20.00 Pack years: 34.00 Types: Cigarettes  Smokeless tobacco: Never Used Substance and Sexual Activity  Alcohol use: No  
 Drug use: Yes Types: Opiates, Heroin   Comment: History of  
  Sexual activity: Not on file Lifestyle  Physical activity:  
  Days per week: Not on file Minutes per session: Not on file  Stress: Not on file Relationships  Social connections:  
  Talks on phone: Not on file Gets together: Not on file Attends Sikh service: Not on file Active member of club or organization: Not on file Attends meetings of clubs or organizations: Not on file Relationship status: Not on file  Intimate partner violence:  
  Fear of current or ex partner: Not on file Emotionally abused: Not on file Physically abused: Not on file Forced sexual activity: Not on file Other Topics Concern  Not on file Social History Narrative  Not on file ALLERGIES: Patient has no known allergies. Review of Systems All other systems reviewed and are negative. Vitals:  
 05/17/19 1021 BP: 137/83 Pulse: 98 Resp: 16 Temp: 98.1 °F (36.7 °C) SpO2: 98% Weight: 100.7 kg (222 lb) Height: 5' 9\" (1.753 m) Physical Exam  
Constitutional: He is oriented to person, place, and time. He appears well-developed and well-nourished. No distress. HENT:  
Head: Normocephalic and atraumatic. Eyes: Pupils are equal, round, and reactive to light. EOM are normal.  
Neck: Normal range of motion. Neck supple. Cardiovascular: Normal rate and regular rhythm. Pulmonary/Chest: Effort normal and breath sounds normal.  
Abdominal: Soft. Bowel sounds are normal.  
Musculoskeletal: Normal range of motion. He exhibits tenderness. Rt forearm anti cubital area with 3 cm red raised area, firm, no drainage Neurological: He is alert and oriented to person, place, and time. Skin: Skin is warm. He is not diaphoretic. Psychiatric: He has a normal mood and affect. Nursing note and vitals reviewed. MDM Number of Diagnoses or Management Options Diagnosis management comments: 3.0 cm abscess to rt forearm with I&D, pt to continue current bactrim, warm compresses, see your primary md next week for recheck return to er if symptoms worsen Amount and/or Complexity of Data Reviewed Review and summarize past medical records: yes Risk of Complications, Morbidity, and/or Mortality Presenting problems: moderate Diagnostic procedures: moderate Management options: moderate Patient Progress Patient progress: improved I&D Abcess Simple Date/Time: 5/17/2019 10:39 AM 
Performed by: Georgann Heimlich, PA Authorized by: Georgann Heimlich, PA Consent:  
  Consent obtained:  Verbal 
  Consent given by:  Patient Risks discussed:  Incomplete drainage Alternatives discussed:  Alternative treatment Location:  
  Type:  Abscess Size:  3cm Location:  Upper extremity Upper extremity location:  Arm Arm location:  R lower arm Pre-procedure details:  
  Skin preparation:  Chloraprep Anesthesia (see MAR for exact dosages): Anesthesia method:  Local infiltration Local anesthetic:  Lidocaine 1% w/o epi Procedure type:  
  Complexity:  Simple Procedure details:  
  Needle aspiration: no Incision types:  Single straight Incision depth:  Dermal 
  Scalpel blade:  11 Drainage:  Purulent Drainage amount: Moderate Wound treatment:  Wound left open Packing materials:  None Post-procedure details:  
  Patient tolerance of procedure: Tolerated well, no immediate complications

## 2019-05-17 NOTE — DISCHARGE INSTRUCTIONS
Keep clean, wash at least twice a day with anti bacterial soap, continue current antibiotics, see your primary md for recheck next week, return to er if symptoms worsen

## 2019-07-12 PROBLEM — K60.3 ANAL FISTULA: Status: ACTIVE | Noted: 2018-01-29

## 2019-09-09 ENCOUNTER — HOSPITAL ENCOUNTER (EMERGENCY)
Age: 49
Discharge: HOME | End: 2019-09-09
Attending: EMERGENCY MEDICINE | Admitting: EMERGENCY MEDICINE
Payer: COMMERCIAL

## 2019-09-09 VITALS
BODY MASS INDEX: 31.1 KG/M2 | DIASTOLIC BLOOD PRESSURE: 85 MMHG | RESPIRATION RATE: 18 BRPM | HEIGHT: 69 IN | WEIGHT: 210 LBS | TEMPERATURE: 98.6 F | HEART RATE: 65 BPM | SYSTOLIC BLOOD PRESSURE: 135 MMHG | OXYGEN SATURATION: 97 %

## 2019-09-09 DIAGNOSIS — L02.413 ABSCESS OF RIGHT FOREARM: Primary | ICD-10-CM

## 2019-09-09 LAB
ALBUMIN SERPL-MCNC: 3.6 G/DL (ref 3.5–5)
ALBUMIN/GLOB SERPL: 0.8 {RATIO} (ref 1.2–3.5)
ALP SERPL-CCNC: 91 U/L (ref 50–136)
ALT SERPL-CCNC: 24 U/L (ref 12–65)
ANION GAP SERPL CALC-SCNC: 6 MMOL/L (ref 7–16)
AST SERPL-CCNC: 43 U/L (ref 15–37)
BASOPHILS # BLD: 0 K/UL (ref 0–0.2)
BASOPHILS NFR BLD: 0 % (ref 0–2)
BILIRUB SERPL-MCNC: 0.7 MG/DL (ref 0.2–1.1)
BUN SERPL-MCNC: 11 MG/DL (ref 6–23)
CALCIUM SERPL-MCNC: 9 MG/DL (ref 8.3–10.4)
CHLORIDE SERPL-SCNC: 100 MMOL/L (ref 98–107)
CO2 SERPL-SCNC: 28 MMOL/L (ref 21–32)
CREAT SERPL-MCNC: 1.12 MG/DL (ref 0.8–1.5)
DIFFERENTIAL METHOD BLD: ABNORMAL
EOSINOPHIL # BLD: 0 K/UL (ref 0–0.8)
EOSINOPHIL NFR BLD: 0 % (ref 0.5–7.8)
ERYTHROCYTE [DISTWIDTH] IN BLOOD BY AUTOMATED COUNT: 14.4 % (ref 11.9–14.6)
GLOBULIN SER CALC-MCNC: 4.6 G/DL (ref 2.3–3.5)
GLUCOSE SERPL-MCNC: 83 MG/DL (ref 65–100)
HCT VFR BLD AUTO: 39.7 % (ref 41.1–50.3)
HGB BLD-MCNC: 12.6 G/DL (ref 13.6–17.2)
IMM GRANULOCYTES # BLD AUTO: 0.1 K/UL (ref 0–0.5)
IMM GRANULOCYTES NFR BLD AUTO: 1 % (ref 0–5)
LACTATE BLD-SCNC: 1.27 MMOL/L (ref 0.5–1.9)
LYMPHOCYTES # BLD: 2.1 K/UL (ref 0.5–4.6)
LYMPHOCYTES NFR BLD: 17 % (ref 13–44)
MCH RBC QN AUTO: 27.6 PG (ref 26.1–32.9)
MCHC RBC AUTO-ENTMCNC: 31.7 G/DL (ref 31.4–35)
MCV RBC AUTO: 87.1 FL (ref 79.6–97.8)
MONOCYTES # BLD: 1.4 K/UL (ref 0.1–1.3)
MONOCYTES NFR BLD: 11 % (ref 4–12)
NEUTS SEG # BLD: 8.8 K/UL (ref 1.7–8.2)
NEUTS SEG NFR BLD: 71 % (ref 43–78)
NRBC # BLD: 0 K/UL (ref 0–0.2)
PLATELET # BLD AUTO: 318 K/UL (ref 150–450)
PLATELET COMMENTS,PCOM: ABNORMAL
PMV BLD AUTO: 9.5 FL (ref 9.4–12.3)
POTASSIUM SERPL-SCNC: 4.4 MMOL/L (ref 3.5–5.1)
PROT SERPL-MCNC: 8.2 G/DL (ref 6.3–8.2)
RBC # BLD AUTO: 4.56 M/UL (ref 4.23–5.6)
RBC MORPH BLD: ABNORMAL
SODIUM SERPL-SCNC: 134 MMOL/L (ref 136–145)
WBC # BLD AUTO: 12.4 K/UL (ref 4.3–11.1)
WBC MORPH BLD: ABNORMAL

## 2019-09-09 PROCEDURE — 85025 COMPLETE CBC W/AUTO DIFF WBC: CPT

## 2019-09-09 PROCEDURE — 83605 ASSAY OF LACTIC ACID: CPT

## 2019-09-09 PROCEDURE — 80053 COMPREHEN METABOLIC PANEL: CPT

## 2019-09-09 PROCEDURE — 99284 EMERGENCY DEPT VISIT MOD MDM: CPT | Performed by: EMERGENCY MEDICINE

## 2019-09-09 PROCEDURE — 75810000289 HC I&D ABSCESS SIMP/COMP/MULT: Performed by: EMERGENCY MEDICINE

## 2019-09-09 PROCEDURE — 87040 BLOOD CULTURE FOR BACTERIA: CPT

## 2019-09-09 PROCEDURE — 74011250637 HC RX REV CODE- 250/637: Performed by: EMERGENCY MEDICINE

## 2019-09-09 PROCEDURE — 77030019895 HC PCKNG STRP IODO -A: Performed by: EMERGENCY MEDICINE

## 2019-09-09 RX ORDER — SULFAMETHOXAZOLE AND TRIMETHOPRIM 800; 160 MG/1; MG/1
1 TABLET ORAL
Status: COMPLETED | OUTPATIENT
Start: 2019-09-09 | End: 2019-09-09

## 2019-09-09 RX ORDER — SULFAMETHOXAZOLE AND TRIMETHOPRIM 800; 160 MG/1; MG/1
1 TABLET ORAL 2 TIMES DAILY
Qty: 14 TAB | Refills: 0 | Status: SHIPPED | OUTPATIENT
Start: 2019-09-09 | End: 2019-09-29 | Stop reason: SDUPTHER

## 2019-09-09 RX ORDER — IBUPROFEN 800 MG/1
800 TABLET ORAL
Qty: 20 TAB | Refills: 0 | Status: SHIPPED | OUTPATIENT
Start: 2019-09-09 | End: 2019-09-16

## 2019-09-09 RX ORDER — IBUPROFEN 800 MG/1
800 TABLET ORAL
Status: COMPLETED | OUTPATIENT
Start: 2019-09-09 | End: 2019-09-09

## 2019-09-09 RX ADMIN — IBUPROFEN 800 MG: 800 TABLET, FILM COATED ORAL at 22:25

## 2019-09-09 RX ADMIN — SULFAMETHOXAZOLE AND TRIMETHOPRIM 1 TABLET: 800; 160 TABLET ORAL at 22:25

## 2019-09-10 NOTE — DISCHARGE INSTRUCTIONS
Keep wound covered with gauze. Change as needed. May wash gently with soap and water if needed. Return to take packing out in 2 days if it does not fall out before then. Continue to wash with soap and water and applying antibiotic ointment around the edge and cover with gauze. Recheck if not improving or for high fever chills and vomiting. Do not like your needles use clean needles. Consider calling your friends at [facility] or help. Patient Education        Skin Abscess: Care Instructions  Your Care Instructions    A skin abscess is a bacterial infection that forms a pocket of pus. A boil is a kind of skin abscess. The doctor may have cut an opening in the abscess so that the pus can drain out. You may have gauze in the cut so that the abscess will stay open and keep draining. You may need antibiotics. You will need to follow up with your doctor to make sure the infection has gone away. The doctor has checked you carefully, but problems can develop later. If you notice any problems or new symptoms, get medical treatment right away. Follow-up care is a key part of your treatment and safety. Be sure to make and go to all appointments, and call your doctor if you are having problems. It's also a good idea to know your test results and keep a list of the medicines you take. How can you care for yourself at home? · Apply warm and dry compresses, a heating pad set on low, or a hot water bottle 3 or 4 times a day for pain. Keep a cloth between the heat source and your skin. · If your doctor prescribed antibiotics, take them as directed. Do not stop taking them just because you feel better. You need to take the full course of antibiotics. · Take pain medicines exactly as directed. ? If the doctor gave you a prescription medicine for pain, take it as prescribed. ? If you are not taking a prescription pain medicine, ask your doctor if you can take an over-the-counter medicine.   · Keep your bandage clean and dry. Change the bandage whenever it gets wet or dirty, or at least one time a day. · If the abscess was packed with gauze:  ? Keep follow-up appointments to have the gauze changed or removed. If the doctor instructed you to remove the gauze, follow the instructions you were given for how to remove it. ? After the gauze is removed, soak the area in warm water for 15 to 20 minutes 2 times a day, until the wound closes. When should you call for help? Call your doctor now or seek immediate medical care if:    · You have signs of worsening infection, such as:  ? Increased pain, swelling, warmth, or redness. ? Red streaks leading from the infected skin. ? Pus draining from the wound. ? A fever.    Watch closely for changes in your health, and be sure to contact your doctor if:    · You do not get better as expected. Where can you learn more? Go to http://tessie-georgina.info/. Enter U830 in the search box to learn more about \"Skin Abscess: Care Instructions. \"  Current as of: April 1, 2019  Content Version: 12.1  © 2258-7455 [employer]. Care instructions adapted under license by [employer] (which disclaims liability or warranty for this information). If you have questions about a medical condition or this instruction, always ask your healthcare professional. [address] any warranty or liability for your use of this information.

## 2019-09-10 NOTE — ED PROVIDER NOTES
50-year-old male states 1 week history of increasing right forearm pain. Shot up heroin at that time. No fever chills or numbness    The history is provided by the patient. Skin Problem    This is a new problem. The current episode started more than 2 days ago. The problem has been gradually worsening. There has been no fever. The rash is present on the right arm. The pain is moderate. Associated symptoms include pain. Pertinent negatives include no weeping.         Past Medical History:   Diagnosis Date    Anal fistula     Arthritis     OA     Current smoker     Depression     Hepatitis C     History of drug abuse     Infectious disease     Hep C    Sciatic nerve pain     Snores        Past Surgical History:   Procedure Laterality Date    HX HERNIA REPAIR  1996/2011    right inguinal/ left inguinal    HX OTHER SURGICAL  12/08/2017    anal fistula         Family History:   Problem Relation Age of Onset    Psychiatric Disorder Mother     Cancer Mother         leukemia    Alcohol abuse Father         Drug abuse       Social History     Socioeconomic History    Marital status:      Spouse name: Not on file    Number of children: Not on file    Years of education: Not on file    Highest education level: Not on file   Occupational History    Not on file   Social Needs    Financial resource strain: Not on file    Food insecurity:     Worry: Not on file     Inability: Not on file    Transportation needs:     Medical: Not on file     Non-medical: Not on file   Tobacco Use    Smoking status: Current Every Day Smoker     Packs/day: 1.00     Years: 20.00     Pack years: 20.00     Types: Cigarettes     Start date: 7/12/1989    Smokeless tobacco: Never Used   Substance and Sexual Activity    Alcohol use: No    Drug use: Yes     Types: Opiates, Heroin     Comment: History of    Sexual activity: Not on file   Lifestyle    Physical activity:     Days per week: Not on file     Minutes per session: Not on file    Stress: Not on file   Relationships    Social connections:     Talks on phone: Not on file     Gets together: Not on file     Attends Pentecostalism service: Not on file     Active member of club or organization: Not on file     Attends meetings of clubs or organizations: Not on file     Relationship status: Not on file    Intimate partner violence:     Fear of current or ex partner: Not on file     Emotionally abused: Not on file     Physically abused: Not on file     Forced sexual activity: Not on file   Other Topics Concern    Not on file   Social History Narrative    Not on file         ALLERGIES: Patient has no known allergies. Review of Systems   Constitutional: Negative for chills and fever. Neurological: Negative for weakness and numbness. Vitals:    09/09/19 1923   BP: 136/86   Pulse: 68   Resp: 16   Temp: 98.3 °F (36.8 °C)   Weight: 95.3 kg (210 lb)   Height: 5' 9\" (1.753 m)            Physical Exam   Constitutional: He appears well-developed and well-nourished. No distress. Skin: Skin is warm and dry. Right forearm with some erythema on the volar surface. Distal neurovascular intact. Proximally 4 cm area diameter of fluctuance volar forearm on the or aspect. No drainage no crepitus   Nursing note and vitals reviewed.        MDM  Number of Diagnoses or Management Options  Diagnosis management comments: Needs incision and drainage       Amount and/or Complexity of Data Reviewed  Clinical lab tests: ordered and reviewed    Risk of Complications, Morbidity, and/or Mortality  Presenting problems: low  Diagnostic procedures: minimal  Management options: low    Patient Progress  Patient progress: improved         I&D Abcess Simple  Date/Time: 9/9/2019 10:27 PM  Performed by: Kendell Riggins MD  Authorized by: Kendell Riggins MD     Consent:     Consent obtained:  Verbal  Location:     Type:  Abscess    Location:  Upper extremity    Upper extremity location:  Arm    Arm location:  R lower arm  Pre-procedure details:     Skin preparation:  Betadine  Anesthesia (see MAR for exact dosages): Anesthesia method:  Local infiltration    Local anesthetic:  Lidocaine 1% w/o epi  Procedure type:     Complexity:  Simple  Procedure details:     Needle aspiration: no      Incision types:  Single straight    Incision depth:  Subcutaneous    Wound management:  Probed and deloculated    Drainage:  Purulent    Drainage amount: Moderate    Packing materials:  1/4 in gauze  Post-procedure details:     Patient tolerance of procedure:   Tolerated well, no immediate complications

## 2019-09-10 NOTE — ED NOTES
I have reviewed discharge instructions with the patient. The patient verbalized understanding. Patient left ED via Discharge Method: ambulatory to Home with self. Opportunity for questions and clarification provided. Patient given 2 scripts. To continue your aftercare when you leave the hospital, you may receive an automated call from our care team to check in on how you are doing. This is a free service and part of our promise to provide the best care and service to meet your aftercare needs.  If you have questions, or wish to unsubscribe from this service please call [phone]. Thank you for Choosing our [facility] Emergency Department.

## 2019-09-11 ENCOUNTER — HOSPITAL ENCOUNTER (OUTPATIENT)
Dept: GENERAL RADIOLOGY | Age: 49
Discharge: HOME | End: 2019-09-11

## 2019-09-11 DIAGNOSIS — M54.50 ACUTE RIGHT-SIDED LOW BACK PAIN WITHOUT SCIATICA: ICD-10-CM

## 2019-09-11 NOTE — PROGRESS NOTES
Originally Xray ordered at Bilateral, patient only wanted right ribs imaged since that is where pain is and where his injury is located. Order was changed to right ribs only. Patient did not want left ribs imaged.

## 2019-09-14 LAB
BACTERIA SPEC CULT: NORMAL
BACTERIA SPEC CULT: NORMAL
SERVICE CMNT-IMP: NORMAL
SERVICE CMNT-IMP: NORMAL

## 2019-09-29 ENCOUNTER — HOSPITAL ENCOUNTER (EMERGENCY)
Age: 49
Discharge: HOME | End: 2019-09-29
Attending: EMERGENCY MEDICINE
Payer: COMMERCIAL

## 2019-09-29 VITALS
TEMPERATURE: 98.6 F | HEIGHT: 69 IN | HEART RATE: 90 BPM | BODY MASS INDEX: 31.1 KG/M2 | WEIGHT: 210 LBS | RESPIRATION RATE: 16 BRPM | OXYGEN SATURATION: 97 % | DIASTOLIC BLOOD PRESSURE: 85 MMHG | SYSTOLIC BLOOD PRESSURE: 133 MMHG

## 2019-09-29 DIAGNOSIS — L02.91 ABSCESS: Primary | ICD-10-CM

## 2019-09-29 PROCEDURE — 99281 EMR DPT VST MAYX REQ PHY/QHP: CPT | Performed by: PHYSICIAN ASSISTANT

## 2019-09-29 PROCEDURE — 75810000289 HC I&D ABSCESS SIMP/COMP/MULT: Performed by: PHYSICIAN ASSISTANT

## 2019-09-29 RX ORDER — SULFAMETHOXAZOLE AND TRIMETHOPRIM 800; 160 MG/1; MG/1
1 TABLET ORAL 2 TIMES DAILY
Qty: 14 TAB | Refills: 0 | Status: SHIPPED | OUTPATIENT
Start: 2019-09-29 | End: 2019-10-11 | Stop reason: ALTCHOICE

## 2019-09-29 NOTE — ED TRIAGE NOTES
Patient advises dropping open utility knife and cutting inside of right ankle about 1 week ago, 3 days ago started swelling and redness to area.
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
## 2020-01-30 PROBLEM — G47.30 SLEEP APNEA IN ADULT: Status: ACTIVE | Noted: 2020-01-30

## 2020-02-19 PROBLEM — F41.1 GAD (GENERALIZED ANXIETY DISORDER): Status: ACTIVE | Noted: 2020-02-19

## 2020-02-19 PROBLEM — E78.2 MIXED HYPERLIPIDEMIA: Status: ACTIVE | Noted: 2020-02-19

## 2020-02-19 PROBLEM — F51.04 PSYCHOPHYSIOLOGICAL INSOMNIA: Status: ACTIVE | Noted: 2020-02-19

## 2020-02-19 PROBLEM — F33.9 MAJOR DEPRESSION, RECURRENT, CHRONIC (HCC): Status: ACTIVE | Noted: 2020-02-19

## 2020-07-13 ENCOUNTER — HOSPITAL ENCOUNTER (EMERGENCY)
Age: 50
Discharge: HOME | End: 2020-07-13
Attending: EMERGENCY MEDICINE
Payer: COMMERCIAL

## 2020-07-13 VITALS
WEIGHT: 210 LBS | RESPIRATION RATE: 18 BRPM | SYSTOLIC BLOOD PRESSURE: 156 MMHG | TEMPERATURE: 97.9 F | BODY MASS INDEX: 31.1 KG/M2 | HEIGHT: 69 IN | OXYGEN SATURATION: 96 % | DIASTOLIC BLOOD PRESSURE: 111 MMHG | HEART RATE: 92 BPM

## 2020-07-13 DIAGNOSIS — T50.901A DRUG OVERDOSE, ACCIDENTAL OR UNINTENTIONAL, INITIAL ENCOUNTER: Primary | ICD-10-CM

## 2020-07-13 LAB
ALBUMIN SERPL-MCNC: 3.9 G/DL (ref 3.5–5)
ALBUMIN/GLOB SERPL: 1 {RATIO} (ref 1.2–3.5)
ALP SERPL-CCNC: 80 U/L (ref 50–136)
ALT SERPL-CCNC: 25 U/L (ref 12–65)
ANION GAP SERPL CALC-SCNC: 7 MMOL/L (ref 7–16)
AST SERPL-CCNC: 20 U/L (ref 15–37)
BASOPHILS # BLD: 0 K/UL (ref 0–0.2)
BASOPHILS NFR BLD: 0 % (ref 0–2)
BILIRUB SERPL-MCNC: 0.5 MG/DL (ref 0.2–1.1)
BUN SERPL-MCNC: 13 MG/DL (ref 6–23)
CALCIUM SERPL-MCNC: 8.4 MG/DL (ref 8.3–10.4)
CHLORIDE SERPL-SCNC: 106 MMOL/L (ref 98–107)
CO2 SERPL-SCNC: 27 MMOL/L (ref 21–32)
CREAT SERPL-MCNC: 1.52 MG/DL (ref 0.8–1.5)
DIFFERENTIAL METHOD BLD: ABNORMAL
EOSINOPHIL # BLD: 0 K/UL (ref 0–0.8)
EOSINOPHIL NFR BLD: 0 % (ref 0.5–7.8)
ERYTHROCYTE [DISTWIDTH] IN BLOOD BY AUTOMATED COUNT: 13.2 % (ref 11.9–14.6)
GLOBULIN SER CALC-MCNC: 4 G/DL (ref 2.3–3.5)
GLUCOSE SERPL-MCNC: 176 MG/DL (ref 65–100)
HCT VFR BLD AUTO: 41.3 % (ref 41.1–50.3)
HGB BLD-MCNC: 13.1 G/DL (ref 13.6–17.2)
IMM GRANULOCYTES # BLD AUTO: 0 K/UL (ref 0–0.5)
IMM GRANULOCYTES NFR BLD AUTO: 1 % (ref 0–5)
LYMPHOCYTES # BLD: 1.5 K/UL (ref 0.5–4.6)
LYMPHOCYTES NFR BLD: 28 % (ref 13–44)
MCH RBC QN AUTO: 28.7 PG (ref 26.1–32.9)
MCHC RBC AUTO-ENTMCNC: 31.7 G/DL (ref 31.4–35)
MCV RBC AUTO: 90.4 FL (ref 79.6–97.8)
MONOCYTES # BLD: 0.7 K/UL (ref 0.1–1.3)
MONOCYTES NFR BLD: 14 % (ref 4–12)
NEUTS SEG # BLD: 3.1 K/UL (ref 1.7–8.2)
NEUTS SEG NFR BLD: 57 % (ref 43–78)
NRBC # BLD: 0 K/UL (ref 0–0.2)
PLATELET # BLD AUTO: 234 K/UL (ref 150–450)
PMV BLD AUTO: 9.1 FL (ref 9.4–12.3)
POTASSIUM SERPL-SCNC: 3.8 MMOL/L (ref 3.5–5.1)
PROT SERPL-MCNC: 7.9 G/DL (ref 6.3–8.2)
RBC # BLD AUTO: 4.57 M/UL (ref 4.23–5.6)
SODIUM SERPL-SCNC: 140 MMOL/L (ref 136–145)
WBC # BLD AUTO: 5.4 K/UL (ref 4.3–11.1)

## 2020-07-13 PROCEDURE — 93005 ELECTROCARDIOGRAM TRACING: CPT | Performed by: EMERGENCY MEDICINE

## 2020-07-13 PROCEDURE — 80053 COMPREHEN METABOLIC PANEL: CPT

## 2020-07-13 PROCEDURE — 99284 EMERGENCY DEPT VISIT MOD MDM: CPT

## 2020-07-13 PROCEDURE — 85025 COMPLETE CBC W/AUTO DIFF WBC: CPT

## 2020-07-13 RX ORDER — NALOXONE HYDROCHLORIDE 4 MG/.1ML
SPRAY NASAL
Qty: 1 EACH | Refills: 2 | Status: SHIPPED | OUTPATIENT
Start: 2020-07-13 | End: 2022-02-01

## 2020-07-14 LAB
ATRIAL RATE: 86 BPM
CALCULATED P AXIS, ECG09: 65 DEGREES
CALCULATED R AXIS, ECG10: 67 DEGREES
CALCULATED T AXIS, ECG11: 60 DEGREES
DIAGNOSIS, 93000: NORMAL
P-R INTERVAL, ECG05: 146 MS
Q-T INTERVAL, ECG07: 354 MS
QRS DURATION, ECG06: 76 MS
QTC CALCULATION (BEZET), ECG08: 423 MS
VENTRICULAR RATE, ECG03: 86 BPM
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
## 2022-02-01 ENCOUNTER — HOSPITAL ENCOUNTER (EMERGENCY)
Age: 52
Discharge: HOME | End: 2022-02-01
Attending: EMERGENCY MEDICINE
Payer: COMMERCIAL

## 2022-02-01 VITALS
DIASTOLIC BLOOD PRESSURE: 98 MMHG | HEART RATE: 92 BPM | WEIGHT: 200 LBS | HEIGHT: 69 IN | BODY MASS INDEX: 29.62 KG/M2 | SYSTOLIC BLOOD PRESSURE: 172 MMHG | OXYGEN SATURATION: 99 % | TEMPERATURE: 99 F | RESPIRATION RATE: 19 BRPM

## 2022-02-01 DIAGNOSIS — L02.414 CUTANEOUS ABSCESS OF LEFT UPPER EXTREMITY: Primary | ICD-10-CM

## 2022-02-01 PROCEDURE — 75810000289 HC I&D ABSCESS SIMP/COMP/MULT

## 2022-02-01 PROCEDURE — 99282 EMERGENCY DEPT VISIT SF MDM: CPT

## 2022-02-01 RX ORDER — SULFAMETHOXAZOLE AND TRIMETHOPRIM 800; 160 MG/1; MG/1
1 TABLET ORAL 2 TIMES DAILY
Qty: 14 TABLET | Refills: 0 | Status: SHIPPED | OUTPATIENT
Start: 2022-02-01 | End: 2022-02-08
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
## 2022-02-01 NOTE — ED NOTES
I have reviewed discharge instructions with the patient. The patient verbalized understanding. Patient left ED via Discharge Method: ambulatory to Home with self. Opportunity for questions and clarification provided. Patient given 1 scripts. To continue your aftercare when you leave the hospital, you may receive an automated call from our care team to check in on how you are doing. This is a free service and part of our promise to provide the best care and service to meet your aftercare needs.  If you have questions, or wish to unsubscribe from this service please call [phone]. Thank you for Choosing our [facility] Emergency Department.

## 2022-02-01 NOTE — DISCHARGE INSTRUCTIONS
Return to the ED immediately for any new, worsening, or concerning symptoms, or danger signs as we discussed. Otherwise, follow up with your PCP in 1-2 days for reevaluation.    Discontinue IV drug use, other illicit substance use

## 2022-03-18 PROBLEM — K60.30 ANAL FISTULA: Status: ACTIVE | Noted: 2018-01-29

## 2022-03-18 PROBLEM — F41.1 GAD (GENERALIZED ANXIETY DISORDER): Status: ACTIVE | Noted: 2020-02-19

## 2022-03-18 PROBLEM — K60.3 ANAL FISTULA: Status: ACTIVE | Noted: 2018-01-29

## 2022-03-18 PROBLEM — E78.2 MIXED HYPERLIPIDEMIA: Status: ACTIVE | Noted: 2020-02-19

## 2022-03-19 PROBLEM — F33.9 RECURRENT DEPRESSION (HCC): Status: ACTIVE | Noted: 2017-12-21

## 2022-03-19 PROBLEM — G47.30 SLEEP APNEA IN ADULT: Status: ACTIVE | Noted: 2020-01-30

## 2022-03-19 PROBLEM — F33.9 MAJOR DEPRESSION, RECURRENT, CHRONIC (HCC): Status: ACTIVE | Noted: 2020-02-19

## 2022-03-19 PROBLEM — F51.04 PSYCHOPHYSIOLOGICAL INSOMNIA: Status: ACTIVE | Noted: 2020-02-19

## 2022-06-22 ENCOUNTER — HOSPITAL ENCOUNTER (EMERGENCY)
Age: 52
Discharge: LEFT BEFORE DISCHARGE | End: 2022-06-22
Attending: EMERGENCY MEDICINE
Payer: COMMERCIAL

## 2022-06-22 VITALS
WEIGHT: 207 LBS | BODY MASS INDEX: 30.66 KG/M2 | DIASTOLIC BLOOD PRESSURE: 100 MMHG | SYSTOLIC BLOOD PRESSURE: 149 MMHG | TEMPERATURE: 98.6 F | HEART RATE: 72 BPM | HEIGHT: 69 IN | OXYGEN SATURATION: 100 % | RESPIRATION RATE: 20 BRPM

## 2022-06-22 DIAGNOSIS — S39.012A BACK STRAIN, INITIAL ENCOUNTER: Primary | ICD-10-CM

## 2022-06-22 LAB
ANION GAP SERPL CALC-SCNC: 13 MMOL/L (ref 7–16)
BASOPHILS # BLD: 0 K/UL (ref 0–0.2)
BASOPHILS NFR BLD: 0 % (ref 0–2)
BUN SERPL-MCNC: 17 MG/DL (ref 7–18)
CALCIUM SERPL-MCNC: 9.5 MG/DL (ref 8.3–10.4)
CHLORIDE SERPL-SCNC: 102 MMOL/L (ref 98–107)
CO2 SERPL-SCNC: 26 MMOL/L (ref 21–32)
CREAT SERPL-MCNC: 0.74 MG/DL (ref 0.8–1.5)
DIFFERENTIAL METHOD BLD: ABNORMAL
EOSINOPHIL # BLD: 0 K/UL (ref 0–0.8)
EOSINOPHIL NFR BLD: 0 % (ref 0.5–7.8)
ERYTHROCYTE [DISTWIDTH] IN BLOOD BY AUTOMATED COUNT: 14 % (ref 11.9–14.6)
GLUCOSE SERPL-MCNC: 138 MG/DL (ref 65–100)
HCT VFR BLD AUTO: 38.9 % (ref 41.1–50.3)
HGB BLD-MCNC: 12.9 G/DL (ref 13.6–17.2)
IMM GRANULOCYTES # BLD AUTO: 0 K/UL (ref 0–0.5)
IMM GRANULOCYTES NFR BLD AUTO: 0 % (ref 0–5)
LACTATE SERPL-SCNC: 2 MMOL/L (ref 0.4–2)
LYMPHOCYTES # BLD: 2.4 K/UL (ref 0.5–4.6)
LYMPHOCYTES NFR BLD: 39 % (ref 13–44)
MCH RBC QN AUTO: 28.4 PG (ref 26.1–32.9)
MCHC RBC AUTO-ENTMCNC: 33.2 G/DL (ref 31.4–35)
MCV RBC AUTO: 85.7 FL (ref 79.6–97.8)
MONOCYTES # BLD: 1.1 K/UL (ref 0.1–1.3)
MONOCYTES NFR BLD: 19 % (ref 4–12)
NEUTS SEG # BLD: 2.5 K/UL (ref 1.7–8.2)
NEUTS SEG NFR BLD: 41 % (ref 43–78)
NRBC # BLD: 0 K/UL (ref 0–0.2)
PLATELET # BLD AUTO: 270 K/UL (ref 150–450)
PMV BLD AUTO: 8.6 FL (ref 9.4–12.3)
POTASSIUM SERPL-SCNC: 3.4 MMOL/L (ref 3.5–5.1)
RBC # BLD AUTO: 4.54 M/UL (ref 4.23–5.6)
SODIUM SERPL-SCNC: 141 MMOL/L (ref 136–145)
WBC # BLD AUTO: 6.1 K/UL (ref 4.3–11.1)

## 2022-06-22 PROCEDURE — 87040 BLOOD CULTURE FOR BACTERIA: CPT

## 2022-06-22 PROCEDURE — 86140 C-REACTIVE PROTEIN: CPT

## 2022-06-22 PROCEDURE — 6360000002 HC RX W HCPCS: Performed by: EMERGENCY MEDICINE

## 2022-06-22 PROCEDURE — 85025 COMPLETE CBC W/AUTO DIFF WBC: CPT

## 2022-06-22 PROCEDURE — 99284 EMERGENCY DEPT VISIT MOD MDM: CPT

## 2022-06-22 PROCEDURE — 85652 RBC SED RATE AUTOMATED: CPT

## 2022-06-22 PROCEDURE — 6370000000 HC RX 637 (ALT 250 FOR IP): Performed by: EMERGENCY MEDICINE

## 2022-06-22 PROCEDURE — 83605 ASSAY OF LACTIC ACID: CPT

## 2022-06-22 PROCEDURE — 96372 THER/PROPH/DIAG INJ SC/IM: CPT

## 2022-06-22 PROCEDURE — 80048 BASIC METABOLIC PNL TOTAL CA: CPT

## 2022-06-22 RX ORDER — HYDROMORPHONE HYDROCHLORIDE 1 MG/ML
1 INJECTION, SOLUTION INTRAMUSCULAR; INTRAVENOUS; SUBCUTANEOUS
Status: DISCONTINUED | OUTPATIENT
Start: 2022-06-22 | End: 2022-06-22

## 2022-06-22 RX ORDER — ONDANSETRON 4 MG/1
4 TABLET, ORALLY DISINTEGRATING ORAL
Status: COMPLETED | OUTPATIENT
Start: 2022-06-22 | End: 2022-06-22

## 2022-06-22 RX ORDER — ONDANSETRON 2 MG/ML
4 INJECTION INTRAMUSCULAR; INTRAVENOUS
Status: DISCONTINUED | OUTPATIENT
Start: 2022-06-22 | End: 2022-06-22

## 2022-06-22 RX ORDER — KETOROLAC TROMETHAMINE 30 MG/ML
60 INJECTION, SOLUTION INTRAMUSCULAR; INTRAVENOUS
Status: COMPLETED | OUTPATIENT
Start: 2022-06-22 | End: 2022-06-22

## 2022-06-22 RX ORDER — KETOROLAC TROMETHAMINE 30 MG/ML
30 INJECTION, SOLUTION INTRAMUSCULAR; INTRAVENOUS
Status: DISCONTINUED | OUTPATIENT
Start: 2022-06-22 | End: 2022-06-22

## 2022-06-22 RX ADMIN — ONDANSETRON 4 MG: 4 TABLET, ORALLY DISINTEGRATING ORAL at 23:17

## 2022-06-22 RX ADMIN — KETOROLAC TROMETHAMINE 60 MG: 30 INJECTION, SOLUTION INTRAMUSCULAR at 23:17

## 2022-06-22 ASSESSMENT — ENCOUNTER SYMPTOMS
SHORTNESS OF BREATH: 0
ABDOMINAL PAIN: 0
VOMITING: 0
COUGH: 0
DIARRHEA: 0
BACK PAIN: 1
RHINORRHEA: 0
EYE REDNESS: 0
COLOR CHANGE: 0
FACIAL SWELLING: 0
NAUSEA: 0
EYE DISCHARGE: 0

## 2022-06-22 ASSESSMENT — PAIN DESCRIPTION - DESCRIPTORS: DESCRIPTORS: ACHING

## 2022-06-22 ASSESSMENT — PAIN DESCRIPTION - ORIENTATION
ORIENTATION: LEFT;LOWER
ORIENTATION: LEFT;LOWER

## 2022-06-22 ASSESSMENT — PAIN DESCRIPTION - PAIN TYPE: TYPE: ACUTE PAIN

## 2022-06-22 ASSESSMENT — PAIN DESCRIPTION - LOCATION
LOCATION: BACK
LOCATION: BACK

## 2022-06-22 ASSESSMENT — PAIN - FUNCTIONAL ASSESSMENT: PAIN_FUNCTIONAL_ASSESSMENT: 0-10

## 2022-06-22 ASSESSMENT — PAIN SCALES - GENERAL
PAINLEVEL_OUTOF10: 10
PAINLEVEL_OUTOF10: 10

## 2022-06-22 ASSESSMENT — PAIN DESCRIPTION - FREQUENCY: FREQUENCY: CONTINUOUS

## 2022-06-23 LAB
CRP SERPL-MCNC: 1.7 MG/DL (ref 0–0.9)
ERYTHROCYTE [SEDIMENTATION RATE] IN BLOOD: 20 MM/HR

## 2022-06-23 NOTE — ED TRIAGE NOTES
Arrives with face mask in place. Ambulatory with difficulty into triage. Reports left lower back pain, non-radiating. Reports hx chronic back problems however states onset this AM and unable to resolve with OTC meds. Denies numbness/tingling, loss of bowel/bladder function. Increased pain with movement or getting up from seated position. Denies n/v/d, urinary symptoms, fevers. Denies injury/trauma. 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

## 2022-06-23 NOTE — ED NOTES
Pt out to nurses station questioning when can leave. Explained awaiting lab results. States \"its cold in there\". Pt proceeds to ambulate without difficulty in hallway. Requesting to be called with results. MD made aware and MD requesting pt to stay for results. Pt swiftly walked back into room jerking door closed stating \"ill wait 5 more minutes then im gone\". Back in room talking loudly on cell phone. AMA form obtained by Primary Nurse, explained in detail to pt. Risks of leaving and benefits of staying for further treatment explained. Verbalized understanding. AMA form signed. Ambulatory with steady marte gait out of ED.       Renny Marin, RN  06/22/22 [id number]

## 2022-06-23 NOTE — ED PROVIDER NOTES
[facility] Provider Note                   PCP:                None Provider               Age: 47 y.o. Sex: male     No diagnosis found. DISPOSITION         New Prescriptions    No medications on file       Orders Placed This Encounter   Procedures    Culture, Blood 1    Culture, Blood 1    Basic Metabolic Panel    CBC with Auto Differential    Lactic Acid    Lactic Acid    C-Reactive Protein    Sedimentation Rate         Feliberto Ramirez is a 47 y.o. 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 HENT: Negative for congestion, facial swelling and rhinorrhea. Eyes: Negative for discharge and redness. Respiratory: Negative for cough and shortness of breath. Cardiovascular: Negative for chest pain and palpitations. Gastrointestinal: Negative for abdominal pain, diarrhea, nausea and vomiting. Genitourinary: Negative for difficulty urinating, dysuria and frequency. Musculoskeletal: Positive for back pain. Negative for arthralgias and myalgias. Skin: Negative for color change and pallor.    Neurological: Negative for dizziness, light-headedness and headaches. All other systems reviewed and are negative. All other systems reviewed and are negative. Past Medical History:   Diagnosis Date    Anal fistula     Arthritis     OA     Current smoker     Depression     Hepatitis C     History of drug abuse (HCC)     Infectious disease     Hep C    Sciatic nerve pain     Snores         Past Surgical History:   Procedure Laterality Date    HERNIA REPAIR  1996/2011    right inguinal/ left inguinal    OTHER SURGICAL HISTORY  12/08/2017    anal fistula        Family History   Problem Relation Age of Onset    Psychiatric Disorder Mother     Alcohol Abuse Father         Drug abuse    Cancer Mother         leukemia           Social Connections:     Frequency of Communication with Friends and Family: Not on file    Frequency of Social Gatherings with Friends and Family: Not on file    Attends Scientology Services: Not on file    Active Member of Clubs or Organizations: Not on file    Attends Club or Organization Meetings: Not on file    Marital Status: Not on file        No Known Allergies     Vitals signs and nursing note reviewed. Patient Vitals for the past 4 hrs:   Temp Pulse Resp BP SpO2   06/22/22 2120 98.6 °F (37 °C) 72 20 (!) 149/100 100 %          Physical Exam  Vitals and nursing note reviewed. Constitutional:       General: He is in acute distress. Appearance: Normal appearance. He is not ill-appearing. HENT:      Head: Normocephalic and atraumatic. Right Ear: External ear normal.      Left Ear: External ear normal.      Nose: Nose normal. No rhinorrhea. Eyes:      General: No scleral icterus. Right eye: No discharge. Left eye: No discharge. Extraocular Movements: Extraocular movements intact. Pulmonary:      Effort: Pulmonary effort is normal. No respiratory distress. Musculoskeletal:         General: Tenderness present. No signs of injury.       Cervical back: Normal range of motion and neck supple. Lumbar back: Tenderness and bony tenderness present. Decreased range of motion. Back:    Skin:     General: Skin is warm and dry. Coloration: Skin is not jaundiced or pale. Neurological:      General: No focal deficit present. Mental Status: He is alert and oriented to person, place, and time. Mental status is at baseline. Gait: Gait normal.   Psychiatric:         Mood and Affect: Mood normal.         Behavior: Behavior normal.          MDM  Number of Diagnoses or Management Options  Diagnosis management comments: Low back pain with ongoing history of drug abuse, and previous episode of vertebral osteomyelitis in 2019  Differential diagnosis to include vertebral osteo-, epidural abscess, sciatica, back strain. Will check labs to include a sed rate and CRP, in the absence of discrete trauma imaging such as plain films or CT scan not really indicated,   care turned over to Dr. Walker Showsarah on bedside rounds with patient and wife present opportunity to ask questions provided  Awaiting lab results and clinical response to analgesics      11:55 PM  Patient left to me by Dr. Madie Bennett. Awaiting ESR and CRP. Patient decided to leave AMA. Did not want to wait for results. He understands the risk of doing this and still would like to leave. He was able to ambulate briskly out the ER door. AMA form signed. He will follow-up results on [facility].        Amount and/or Complexity of Data Reviewed  Clinical lab tests: ordered  Obtain history from someone other than the patient: yes (Wife at bedside)    Risk of Complications, Morbidity, and/or Mortality  Presenting problems: moderate  Diagnostic procedures: low  Management options: low        Procedures    Labs Reviewed   BASIC METABOLIC PANEL - Abnormal; Notable for the following components:       Result Value    Potassium 3.4 (*)     Glucose 138 (*)     CREATININE 0.74 (*)     All other components within normal limits   CBC WITH AUTO DIFFERENTIAL - Abnormal; Notable for the following components:    Hemoglobin 12.9 (*)     Hematocrit 38.9 (*)     MPV 8.6 (*)     Seg Neutrophils 41 (*)     Monocytes 19 (*)     Eosinophils % 0 (*)     All other components within normal limits   CULTURE, BLOOD 1   CULTURE, BLOOD 1   LACTIC ACID   C-REACTIVE PROTEIN   SEDIMENTATION RATE        No orders to display            [place] Coma Scale  Eye Opening: Spontaneous  Best Verbal Response: Oriented  Best Motor Response: Obeys commands  [place] Coma Scale Score: 15                     Voice dictation software was used during the making of this note. This software is not perfect and grammatical and other typographical errors may be present. This note has not been completely proofread for errors.        Tricia Joyce III, MD  06/22/22 [id number]

## 2022-07-07 ENCOUNTER — HOSPITAL ENCOUNTER (OUTPATIENT)
Dept: GENERAL RADIOLOGY | Age: 52
Discharge: HOME | End: 2022-07-09
Payer: COMMERCIAL

## 2022-07-07 ENCOUNTER — OFFICE VISIT (OUTPATIENT)
Dept: INTERNAL MEDICINE CLINIC | Facility: CLINIC | Age: 52
End: 2022-07-07
Payer: COMMERCIAL

## 2022-07-07 VITALS
OXYGEN SATURATION: 98 % | BODY MASS INDEX: 32.44 KG/M2 | RESPIRATION RATE: 20 BRPM | HEART RATE: 89 BPM | DIASTOLIC BLOOD PRESSURE: 84 MMHG | WEIGHT: 219 LBS | SYSTOLIC BLOOD PRESSURE: 118 MMHG | HEIGHT: 69 IN

## 2022-07-07 DIAGNOSIS — M79.671 FOOT PAIN, RIGHT: ICD-10-CM

## 2022-07-07 DIAGNOSIS — M25.571 ACUTE RIGHT ANKLE PAIN: ICD-10-CM

## 2022-07-07 DIAGNOSIS — M79.671 FOOT PAIN, RIGHT: Primary | ICD-10-CM

## 2022-07-07 PROBLEM — J44.9 COPD (CHRONIC OBSTRUCTIVE PULMONARY DISEASE) (HCC): Status: ACTIVE | Noted: 2020-12-18

## 2022-07-07 PROBLEM — G06.2 EPIDURAL ABSCESS: Status: ACTIVE | Noted: 2020-06-17

## 2022-07-07 PROBLEM — M86.9 OSTEOMYELITIS (HCC): Status: ACTIVE | Noted: 2020-06-20

## 2022-07-07 PROBLEM — M46.46 DISCITIS OF LUMBAR REGION: Status: ACTIVE | Noted: 2020-06-22

## 2022-07-07 PROCEDURE — 73610 X-RAY EXAM OF ANKLE: CPT

## 2022-07-07 PROCEDURE — 99203 OFFICE O/P NEW LOW 30 MIN: CPT | Performed by: INTERNAL MEDICINE

## 2022-07-07 PROCEDURE — 73630 X-RAY EXAM OF FOOT: CPT

## 2022-07-07 RX ORDER — PREDNISONE 20 MG/1
20 TABLET ORAL DAILY
Qty: 10 TABLET | Refills: 0 | Status: SHIPPED | OUTPATIENT
Start: 2022-07-07 | End: 2022-07-15 | Stop reason: ALTCHOICE

## 2022-07-07 RX ORDER — BUPRENORPHINE HYDROCHLORIDE AND NALOXONE HYDROCHLORIDE DIHYDRATE 8; 2 MG/1; MG/1
1 TABLET SUBLINGUAL 2 TIMES DAILY
COMMUNITY

## 2022-07-07 ASSESSMENT — PATIENT HEALTH QUESTIONNAIRE - PHQ9
2. FEELING DOWN, DEPRESSED OR HOPELESS: 0
SUM OF ALL RESPONSES TO PHQ QUESTIONS 1-9: 0
3. TROUBLE FALLING OR STAYING ASLEEP: 0
7. TROUBLE CONCENTRATING ON THINGS, SUCH AS READING THE NEWSPAPER OR WATCHING TELEVISION: 0
SUM OF ALL RESPONSES TO PHQ QUESTIONS 1-9: 0
5. POOR APPETITE OR OVEREATING: 0
8. MOVING OR SPEAKING SO SLOWLY THAT OTHER PEOPLE COULD HAVE NOTICED. OR THE OPPOSITE, BEING SO FIGETY OR RESTLESS THAT YOU HAVE BEEN MOVING AROUND A LOT MORE THAN USUAL: 0
9. THOUGHTS THAT YOU WOULD BE BETTER OFF DEAD, OR OF HURTING YOURSELF: 0
6. FEELING BAD ABOUT YOURSELF - OR THAT YOU ARE A FAILURE OR HAVE LET YOURSELF OR YOUR FAMILY DOWN: 0
4. FEELING TIRED OR HAVING LITTLE ENERGY: 0

## 2022-07-07 ASSESSMENT — ENCOUNTER SYMPTOMS
CHEST TIGHTNESS: 0
ABDOMINAL DISTENTION: 0
WHEEZING: 0
SHORTNESS OF BREATH: 0

## 2022-07-07 ASSESSMENT — ANXIETY QUESTIONNAIRES
7. FEELING AFRAID AS IF SOMETHING AWFUL MIGHT HAPPEN: 0
6. BECOMING EASILY ANNOYED OR IRRITABLE: 0
5. BEING SO RESTLESS THAT IT IS HARD TO SIT STILL: 0
2. NOT BEING ABLE TO STOP OR CONTROL WORRYING: 0
4. TROUBLE RELAXING: 0
GAD7 TOTAL SCORE: 0
3. WORRYING TOO MUCH ABOUT DIFFERENT THINGS: 0
1. FEELING NERVOUS, ANXIOUS, OR ON EDGE: 0

## 2022-07-07 NOTE — PATIENT INSTRUCTIONS
Patient Education       A referral has been placed for you. Someone will call you with the details of the appointment and process. Please keep your appointment or be sure to call if you need to reschedule. If you have not received any call after 1 to 2 weeks please contact us. Patient Education        Plantar Fasciitis: Exercises  Introduction  Here are some examples of exercises for you to try. The exercises may be suggested for a condition or for rehabilitation. Start each exercise slowly. Ease off the exercises if you start to have pain. You will be told when to start these exercises and which ones will work bestfor you. How to do the exercises  Towel stretch    1. Sit with your legs extended and knees straight. 2. Place a towel around your foot just under the toes. 3. Hold each end of the towel in each hand, with your hands above your knees. 4. Pull back with the towel so that your foot stretches toward you. 5. Hold the position for at least 15 to 30 seconds. 6. Repeat 2 to 4 times a session, up to 5 sessions a day. Calf stretch    This exercise stretches the muscles at the back of the lower leg (the calf) andthe Achilles tendon. Do this exercise 3 or 4 times a day, 5 days a week. 1. Stand facing a wall with your hands on the wall at about eye level. Put the leg you want to stretch about a step behind your other leg. 2. Keeping your back heel on the floor, bend your front knee until you feel a stretch in the back leg. 3. Hold the stretch for 15 to 30 seconds. Repeat 2 to 4 times. Plantar fascia and calf stretch    Stretching the plantar fascia and calf muscles can increase flexibility and decrease heel pain. You can do this exercise several times each day and beforeand after activity. 1. Stand on a step as shown above. Be sure to hold on to the banister. 2. Slowly let your heels down over the edge of the step as you relax your calf muscles.  You should feel a gentle stretch across the bottom of your foot and up the back of your leg to your knee. 3. Hold the stretch about 15 to 30 seconds, and then tighten your calf muscle a little to bring your heel back up to the level of the step. Repeat 2 to 4 times. Towel curls    Make this exercise more challenging by placing a weighted object, such as asoup can, on the other end of the towel. 1. While sitting, place your foot on a towel on the floor and scrunch the towel toward you with your toes. 2. Then, also using your toes, push the towel away from you. Honeydew pickups    1. Put marbles on the floor next to a cup.  2. Using your toes, try to lift the marbles up from the floor and put them in the cup. Follow-up care is a key part of your treatment and safety. Be sure to make and go to all appointments, and call your doctor if you are having problems. It's also a good idea to know your test results and keep alist of the medicines you take. Where can you learn more? Go to https://Jobfox.YesGraph. org and sign in to your "[employer] account. Enter A605 in the [employer] box to learn more about \"Plantar Fasciitis: Exercises. \"     If you do not have an account, please click on the \"Sign Up Now\" link. Current as of: March 9, 2022               Content Version: 13.3  © 2006-2022 [employer]. Care instructions adapted under license by [facility] ([facility]). If you have questions about a medical condition or this instruction, always ask your healthcare professional. [place] [address] any warranty or liability for your use of this information. Foot Pain: Care Instructions  Your Care Instructions     Foot injuries that cause pain and swelling are fairly common. Almost all sports or home repair projects can cause a misstep that ends up as foot pain. Normalwear and tear, especially as you get older, also can cause foot pain.   Most minor foot injuries will heal on their own, and home treatment is usually all you need to do. If you have a severe injury, you may need tests andtreatment. Follow-up care is a key part of your treatment and safety. Be sure to make and go to all appointments, and call your doctor if you are having problems. It's also a good idea to know your test results and keep alist of the medicines you take. How can you care for yourself at home?  Take pain medicines exactly as directed. ? If the doctor gave you a prescription medicine for pain, take it as prescribed. ? If you are not taking a prescription pain medicine, ask your doctor if you can take an over-the-counter medicine.  Rest and protect your foot. Take a break from any activity that may cause pain.  Put ice or a cold pack on your foot for 10 to 20 minutes at a time. Put a thin cloth between the ice and your skin.  Prop up the sore foot on a pillow when you ice it or anytime you sit or lie down during the next 3 days. Try to keep it above the level of your heart. This will help reduce swelling.  Your doctor may recommend that you wrap your foot with an elastic bandage. Keep your foot wrapped for as long as your doctor advises.  If your doctor recommends crutches, use them as directed.  Wear roomy footwear.  As soon as pain and swelling end, begin gentle exercises of your foot. Your doctor can tell you which exercises will help. When should you call for help? Call 911 anytime you think you may need emergency care. For example, call if:     Your foot turns pale, white, blue, or cold. Call your doctor now or seek immediate medical care if:     You cannot move or stand on your foot.      Your foot looks twisted or out of its normal position.      Your foot is not stable when you step down.      You have signs of infection, such as:  ? Increased pain, swelling, warmth, or redness. ? Red streaks leading from the sore area. ? Pus draining from a place on your foot. ? A fever.      Your foot is numb or tingly.    Watch closely for changes in your health, and be sure to contact your doctor if:     You do not get better as expected.      You have bruises from an injury that last longer than 2 weeks. Where can you learn more? Go to https://[employer].PlayPhone. org and sign in to your [employer] account. Enter A625 in the [employer] box to learn more about \"Foot Pain: Care Instructions. \"     If you do not have an account, please click on the \"Sign Up Now\" link. Current as of: March 9, 2022               Content Version: 13.3  © 8727-0012 Healthwise, Incorporated. Care instructions adapted under license by [facility] ([facility]). If you have questions about a medical condition or this instruction, always ask your healthcare professional. [address] any warranty or liability for your use of this information.

## 2022-07-07 NOTE — PROGRESS NOTES
Chief Complaint   Patient presents with   [address]     right foot pain for couple weeks. Koffi Rodney is a 47 y.o. male who presents today for Establish Care (right foot pain for couple weeks.  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    He reports history of heroine abuse, last use was June 20, he was seen in the emergency room in June 22, with severe back pain, but left AMA, he had a history of discitis, osteomyelitis, few years ago, and needed to be admitted for sepsis, and antibiotic therapy. He reports the back pain is not the main issue today, but his right foot pain. Currently on Suboxone, following with [employer] [facility]. Wt Readings from Last 3 Encounters:   07/07/22 219 lb (99.3 kg)   06/22/22 207 lb (93.9 kg)     Vitals:    07/07/22 1055   BP: 118/84   Site: Right Upper Arm   Position: Sitting   Pulse: 89   Resp: 20   SpO2: 98%   Weight: 219 lb (99.3 kg)   Height: 5' 9\" (1.753 m)        Assessment and plan:  1. Foot pain, right  -     XR FOOT RIGHT (MIN 3 VIEWS); Future  -     XR ANKLE RIGHT (2 VIEWS); Future  -     predniSONE (DELTASONE) 20 MG tablet; Take 1 tablet by mouth daily for 10 days, Disp-10 tablet, R-0Normal  -     [facility]  2. Acute right ankle pain  -     XR FOOT RIGHT (MIN 3 VIEWS); Future  -     XR ANKLE RIGHT (2 VIEWS); Future  -     predniSONE (DELTASONE) 20 MG tablet;  Take 1 tablet by mouth daily for 10 days, Disp-10 tablet, R-0Normal  -     [facility]  Due to his history of osteomyelitis, will check x-ray, rule out any bony lesions, stress fractures, tendinitis, will try prednisone which may treat inflammation and also possible gout as a differential diagnosis. He has tried multiple over-the-counter medications and anti-inflammatories, will refer to orthopedic provider for further evaluation, and possible other therapies. We will follow-up in 2 to 4 weeks of pain and annual exam.  Exercises for plantar fasciitis, local measures like ice and heat. Modification of movements and proper support was discussed with patient. Return in about 4 weeks (around 8/4/2022) for 2-4 weeks ,annual and pain follow up. Review of system:    Review of Systems   Constitutional: Negative for activity change, chills, fatigue, fever and unexpected weight change. Respiratory: Negative for chest tightness, shortness of breath and wheezing. Cardiovascular: Negative for chest pain, palpitations and leg swelling. Gastrointestinal: Negative for abdominal distention. Genitourinary: Negative for difficulty urinating and frequency. Musculoskeletal: Positive for arthralgias and joint swelling. Negative for myalgias. Neurological: Negative for dizziness and headaches. Immunization history:    Immunization History   Administered Date(s) Administered    Hepatitis A Adult (Havrix, Vaqta) 08/28/2018    Hepatitis B 08/28/2018, 10/04/2018    Hepatitis B Adult (Engerix-B) 08/28/2018, 10/04/2018    Influenza Virus Vaccine 08/28/2018    Influenza, Michaela Joseph, IM, PF (6 mo and older Fluzone, Flulaval, Fluarix, and 3 yrs and older Afluria) 08/28/2018    Pneumococcal Polysaccharide ([id number]) 06/20/2020    Tdap (Boostrix, Adacel) 09/04/2014       Current medications:      Current Outpatient Medications:     buprenorphine-naloxone (SUBOXONE) 8-2 MG SUBL SL tablet, Place 1 tablet under the tongue in the morning and at bedtime. , Disp: , Rfl:     predniSONE (DELTASONE) 20 MG tablet, Take 1 tablet by mouth daily for 10 days, Disp: 10 tablet, Rfl: 0      Family history:    Family History   Problem Relation Age of Onset    Psychiatric Disorder Mother     Alcohol Abuse Father         Drug abuse    Cancer Mother         leukemia        Past medical history:    Past Medical History:   Diagnosis Date    Anal fistula     Arthritis     OA     Current smoker     Depression     Hepatitis C     History of drug abuse ([address]     Infectious disease     Hep C    Sciatic nerve pain     Snores           Physical exam:    /84 (Site: Right Upper Arm, Position: Sitting)   Pulse 89   Resp 20   Ht 5' 9\" (1.753 m)   Wt 219 lb (99.3 kg)   SpO2 98%   BMI 32.34 kg/m²     Physical Exam  Vitals reviewed. Constitutional:       Appearance: Normal appearance. Cardiovascular:      Rate and Rhythm: Normal rate. Pulses: Normal pulses. Pulmonary:      Effort: Pulmonary effort is normal.   Musculoskeletal:      Right foot: Decreased range of motion. No deformity or foot drop. Left foot: Normal range of motion. Feet:    Neurological:      Mental Status: He is alert. Mental status is at baseline.           Recent labs:    Lab Results   Component Value Date    CHOL 206 (H) 07/12/2019     Lab Results   Component Value Date    TRIG 64 07/12/2019     Lab Results   Component Value Date    HDL 35 (L) 07/12/2019     Lab Results   Component Value Date    LDLCALC 158 (H) 07/12/2019     Lab Results   Component Value Date    LABVLDL 13 07/12/2019     No results found for: [facility]  Lab Results   Component Value Date     06/22/2022    K 3.4 (L) 06/22/2022     06/22/2022    CO2 26 06/22/2022    BUN 17 06/22/2022    CREATININE 0.74 (L) 06/22/2022    GLUCOSE 138 (H) 06/22/2022    CALCIUM 9.5 06/22/2022    PROT 7.9 07/13/2020    LABALBU 3.9 07/13/2020    BILITOT 0.5 07/13/2020    ALKPHOS 80 07/13/2020    AST 20 07/13/2020    ALT 25 07/13/2020    LABGLOM >60 06/22/2022    GFRAA >142 06/22/2022    AGRATIO 1.0 (L) 07/13/2020    GLOB 4.0 (H) 07/13/2020     Lab Results Component Value Date    WBC 6.1 06/22/2022    HGB 12.9 (L) 06/22/2022    HCT 38.9 (L) 06/22/2022    MCV 85.7 06/22/2022     06/22/2022             This document was generated with the aid of voice recognition software. . Please be aware that there may be inadvertent transcription errors not identified and corrected by the [employer]

## 2022-07-15 ENCOUNTER — OFFICE VISIT (OUTPATIENT)
Dept: ORTHOPEDIC SURGERY | Age: 52
End: 2022-07-15
Payer: COMMERCIAL

## 2022-07-15 VITALS — WEIGHT: 215 LBS | HEIGHT: 69 IN | BODY MASS INDEX: 31.84 KG/M2

## 2022-07-15 DIAGNOSIS — M20.21 HALLUX RIGIDUS OF RIGHT FOOT: ICD-10-CM

## 2022-07-15 DIAGNOSIS — M79.671 RIGHT FOOT PAIN: ICD-10-CM

## 2022-07-15 DIAGNOSIS — M25.571 RIGHT ANKLE PAIN, UNSPECIFIED CHRONICITY: Primary | ICD-10-CM

## 2022-07-15 DIAGNOSIS — M76.821 POSTERIOR TIBIAL TENDINITIS OF RIGHT LOWER EXTREMITY: ICD-10-CM

## 2022-07-15 PROCEDURE — 99204 OFFICE O/P NEW MOD 45 MIN: CPT | Performed by: ORTHOPAEDIC SURGERY

## 2022-07-15 PROCEDURE — L4360 PNEUMAT WALKING BOOT PRE CST: HCPCS | Performed by: ORTHOPAEDIC SURGERY

## 2022-07-15 RX ORDER — PREDNISONE 5 MG/1
TABLET ORAL
Qty: 1 EACH | Refills: 2 | Status: SHIPPED | OUTPATIENT
Start: 2022-07-15

## 2022-07-15 NOTE — LETTER
DME Patient Authorization Form    Name: Steve Schneider  : 1970  MRN: [id number]   Age: 47 y.o. Gender: male  Delivery Address: [facility]     Diagnosis:     ICD-10-CM    1. Right ankle pain, unspecified chronicity  M25.571 XR ANKLE RIGHT (MIN 3 VIEWS)     XR FOOT RIGHT (2 VIEWS)     Ambulatory referral to Physical Therapy     Keila Stahl ()      2. Right foot pain  M79.671 XR ANKLE RIGHT (MIN 3 VIEWS)     XR FOOT RIGHT (2 VIEWS)     Ambulatory referral to Physical Therapy     Keila Stahl ()      3. Hallux rigidus of right foot  M20.21 Ambulatory referral to Physical Therapy     Keila Stahl ()      4. Posterior tibial tendinitis of right lower extremity  M76.821 Ambulatory referral to Physical Therapy     Keila Stahl ()           Requested DME:  Keila Stahl -  ($290.00) X 1 - right        Clinical Notes:     **Indicates non-covered items by insurance. Payment expected on date of service. Electronically signed by  Provider: Robbie Plants. Gracelyn Collet MD   _______________________________ Date: 7/15/2022                             [facility] Tax ID # [id number]        Durable Medical Equipment and/or Orthotics Patient Consent     I understand that my physician has prescribed this medical supply as part of my treatment plan as a matter of Medical Necessity.  I understand that I have a choice in where I receive my prescribed orthopedic supplies and/or services.  I authorize [facility] to furnish this service/product and to provide my insurance carrier with any information requested in order to process for payment.  I instruct my insurance carrier to pay [facility] directly for these services/products.    I understand that my insurance carrier may deny payment for this supply because it is a non-covered item, deemed not medically necessary or considered experimental.   I understand that any cost not covered by my insurance carrier will be solely my financial responsibility.  I have received the Supplier Standards and have reviewed them.  I have received the prescribed item and have been fully instructed on the proper use of the above services/products.    ______ (Patient Initials) I understand that all DME items are non-returnable after being dispensed. Items still in sealed packaging may be returned up to 14 days after purchasing. [address] will replace items that are defective.    ______ (Patient Initials) I understand that [facility] will not file a claim with my insurance carrier for this service/product and I am waiving my right to file a claim on my own for this service/product with my insurance company as this item is NON-COVERED (Denoted by the **) by my Insurance company/policy. ______ (Patient Initials) I understand that I am responsible to bring my equipment to the hospital for any surgery. ______________________________________________  ________________________  Patient / Kennedy De La Rosa            Thank you for considering [address] Your physician has prescribed specific medical equipment or devices for your home use. The following describes your rights and responsibilities as our customer. Right to Choose Providers: You have a choice regarding which company supplies your home medical equipment and devices, and to consult your physician in this decision. You may choose a medical supply store, a home medical equipment provider, or a specialist such as [facility]. [facility] will coordinate with your physician to provide the medical equipment or devices prescribed for your home use. Right to Service:  You have the right to considerate, respectful and nondiscriminatory care.   You have the right to receive accurate and easily understood information about your health care. If you speak a foreign language, or don't understand the discussions, assistance will be provided to allow you to make informed health care decisions. You have the right to know your treatment options and to participate in decisions about your care, including the right to accept or refuse treatment. You have the right to expect a reasonable response to your requests for treatment or service. You have the right to talk in confidence with health care providers and to have your health care information protected. You have the right to receive an explanation of your bill. You have the right to complain about the service or product you receive. Patient Responsibilities:  Please provide complete and accurate information about your health insurance benefits and make arrangements for the timely payment of your bill. [facility] will, if possible, assume responsibility for billing your insurance (Medicare, Medicaid and commercial) for the prescribed equipment or devices. If your policy does not cover the prescribed product, or only covers a portion of the bill, you are responsible for any remaining balance. Return and Exchange Policy:  [facility] will honor published  Warranties for products. [facility] will accept returns or exchanges within 14 days from the date of receipt, providin) the product must be in new condition; 2) receipt as required; and 3) used disposable and hygiene products may only be returned due to a defective product. Note: Refunds will be issued in a timely manner, please allow 4-6 weeks for processing. Complaint Procedures and DME Consumer Protection Resources:  [facility] values you as a customer, and is committed to resolving patient concerns.   This commitment includes understanding and documenting your concerns, conducting a review of internal procedures, and providing you with an explanation and resolution to your concerns. Should you have any questions about our services or billing process, please contact our office at (practice phone number). If we are unable to resolve the concern, you have the right to direct comments to the office of Consumer Protection, in the [address] or the [employer]  office, without fear of repercussion. DMEPOS SUPPLIER STANDARDS    A supplier must be in compliance with all applicable Federal and [facility] and regulatory requirements. A supplier must provide complete and accurate information on the DMEPOS supplier application. Any changes to this information must be reported to the [facility] [employer] within 30 days. An authorized individual (one whose signature is binding) must sign the application for billing privileges. A supplier must fill orders from its own inventory, or must contract with other companies for the purchase of items necessary to fill the order. A supplier may not contract with any entity that is currently excluded from the Medicare program, any [facility] program, or from any other Federal procurement or Nonprocurement programs. A supplier must advise beneficiaries that they may rent or purchase inexpensive or routinely purchased durable medical equipment, and of the purchase option for capped rental equipment. A supplier must notify beneficiaries of warranty coverage and honor all warranties under applicable State Law, and repair or replace free of charge Medicare covered items that are under warranty. A supplier must maintain a physical facility on an appropriate site. A supplier must permit CMS, or its agents to conduct on-site inspections to ascertain the supplier's compliance with these standards. The supplier location must be accessible to beneficiaries during reasonable business hours, and must maintain a visible sign and posted hours of operation.   A supplier must maintain a primary business telephone listed under the name of the business in a [employer] or a toll free number available through directory assistance. The exclusive use of a beeper, answering machine or cell phone is prohibited. A supplier must have comprehensive liability insurance in the amount of at least $300,000 that covers both the supplier's place of business and all customers and employees of the supplier. If the supplier manufactures its own items, this insurance must also cover product liability and completed operations. A supplier must agree not to initiate telephone contact with beneficiaries, with a few exceptions allowed. This standard prohibits suppliers from calling beneficiaries in order to solicit new business. A supplier is responsible for delivery and must instruct beneficiaries on use of Medicare covered items, and maintain proof of delivery. A supplier must answer questions, and respond to complaints of the beneficiaries, and maintain documentation of such contacts. A supplier must maintain and replace at no charge or repair directly, or through a service contract with another company, Medicare covered items it has rented to beneficiaries. A supplier must accept returns of substandard (less than full quality for the particular item) or unsuitable items (inappropriate for the beneficiary at the time it was fitted and rented or sold) from beneficiaries. A supplier must disclose these supplier standards to each beneficiary to whom it supplies a Medicare-covered item. A supplier must disclose to the government any person having ownership, financial, or control interest in the supplier. A supplier must not convey or reassign a supplier number; i.e., the supplier may not sell or allow another entity to use its Medicare billing number. A supplier must have a complaint resolution protocol established to address beneficiary complaints that relate to these standards.   A record of these complaints must be maintained at the physical facility. Complaint records must include: the name, address, telephone number and health insurance claim number of the beneficiary, a summary of the complaint, and any action taken to resolve it. A supplier must agree to furnish CMS any information required by the Medicare statute and implementing regulations. A supplier of DMEPOS and other items and services must be accredited by a CMS-approved accreditation organization in order to receive and retain a supplier billing number. The accreditation must indicate the specific products and services, for which the supplier is accredited in order for the supplier to receive payment for those specific products and services. A DMEPOS supplier must notify their accreditation organization when a new DMEPOS location is opened. The accreditation organization may accredit the new supplier location for three months after it is operational without requiring a new site visit. All DMEPOS supplier locations, whether owned or subcontracted, must meet the [employer] and be separately accredited in order to bill Medicare. An accredited supplier may be denied enrollment or their enrollment may be revoked, if CMS determines that they are not in compliance with the DMEPOS quality standards. A DMEPOS supplier must disclose upon enrollment all products and services, including the addition of new product lines for which they are seeking accreditation. If a new product line is added after enrollment, the DMEPOS supplier will be responsible for notifying the accrediting body of the new product so that the DMEPOS supplier can be re-surveyed and accredited for these new products. Must meet the surety bond requirements specified in 42 C. F.R. 424.57(c). Implementation date- May 4, 2009. A supplier must obtain oxygen from a state-licensed oxygen supplier.   A supplier must maintain ordering and referring documentation consistent with provisions found in 42 C. F.R. 424.516(f). DMEPOS suppliers are prohibited from sharing a practice location with certain other Medicare providers and suppliers. DMEPOS suppliers must remain open to the public for a minimum of 30 hours per week with certain exceptions.

## 2022-07-15 NOTE — PROGRESS NOTES
The patient was prescribed a walker boot for the patient's right foot. The patient wears a size NA shoe and I fitted them with a L size boot. The patient was fitted and instructed on the use of prescribed walker boot. I explained how to fit themselves and that the plastic flexible piece should always be on the front of the boot and secured by the Velcro straps on top. The air bladder in the boot was adjusted according to proper fit and comfort. The patient walked a short distance and acknowledged satisfaction with current fit. I also explained that they need a heel lift or a higher heeled shoe for the uninvolved LE to help normalize gait and avoid excessive low back stress/strain due to leg length inequality created from walker boot. Patient read and signed documenting they understand and agree to [facility]'s current DME return policy.

## 2022-07-15 NOTE — PROGRESS NOTES
Name: Elizabeth Tony  YOB: 1970  Gender: male  MRN: [id number]    CC: Right ankle/foot pain    HPI:   07/07/2022: Dr. Olu Gutierrez outlined foot pain that started several weeks prior to the office visit with no trauma. 07/15/2022: Patient presents with persistent right foot pain. Dr. Olu Gutierrez prescribed Prednisone for 10 days that helped but now that he is off the medication his pain recurred     ROS/Meds/PSH/PMH/FH/SH: reviewed today    Tobacco:  reports that he has been smoking cigarettes. He started smoking about 33 years ago. He has been smoking an average of 1 pack per day. He has never used smokeless tobacco.   Prior heroin for which he takes Suboxone  Prior history of infections    Physical Examination:  Patient appears to be alert and oriented with acceptable appearance. No obvious distress or SOB  CV: appears to have acceptable vascular color and capillary refill  Neuro:appears to have mostly intact light touch sensation   Skin: Right posterior tibial tendon area thickening  MS: Standing: Pes planus: Gait protected pushoff right  Right = distal PTT to insertional pain; 5/5 PTT strength but hurts with resistance  Right = no first MTP pain; no hindfoot pain  Right = has full appearing ankle/foot motion; 5/5 strength; no instability or crepitance    XR: Right side: Standing AP lateral mortise ankle plus AP oblique foot taken today with moderate pes planovalgus; dorsal talar neck exostosis; hallux rigidus with significant arthritic collapse and spurring  XR Impression:  As above      Reviewed Test/Records/Documents: 07/07/2022 outside records and plain film x-rays nonweightbearing ankle and foot    Injection: No indication today for injection    Assessment:    Right pes planovalgus, posterior tibial tendinitis  Right hallux rigidus    Plan:   The patient and I discussed the above assessment. We explored treatment options. His current problem deals with posterior tibial tendinitis.   [place] at this time, he has no evidence of deficiency  Advanced medical imaging: If no resolution: Right ankle MRI scan to rule out distal PTT tear    DME: Placed in a 3D boot  We discussed ankle and foot care and 3D boot protection  PT: Prescribed at [id number] [facility]  Orthotic/prosthetic: Future consideration for custom insoles       Medication - OTC meds prn: Prescribed: Boswellia, Devil's claw, Turmeric-curcumin   Magne sports topical rub with frankincense and myrrh   Prednisone 5 mg pack; # 48: take per package insert; 2 refills      Surgical discussion: Future consideration would involve posterior tibial tendon reconstruction if he cannot resolve his problem  Follow up: 4 weeks  Work status: He understands not to be using ladders or climbing be on any heights while in the 3D boot    This note was created using Dragon voice recognition software which may result in errors of speech and spelling recognition and word/phrase syntax errors.

## 2022-08-12 ENCOUNTER — TELEPHONE (OUTPATIENT)
Dept: ORTHOPEDIC SURGERY | Age: 52
End: 2022-08-12

## 2022-11-11 DIAGNOSIS — M79.671 RIGHT FOOT PAIN: ICD-10-CM

## 2022-11-11 DIAGNOSIS — M25.571 RIGHT ANKLE PAIN, UNSPECIFIED CHRONICITY: ICD-10-CM

## 2022-11-11 DIAGNOSIS — M20.21 HALLUX RIGIDUS OF RIGHT FOOT: ICD-10-CM

## 2022-11-11 DIAGNOSIS — M76.821 POSTERIOR TIBIAL TENDINITIS OF RIGHT LOWER EXTREMITY: ICD-10-CM

## 2022-11-11 RX ORDER — PREDNISONE 5 MG/1
TABLET ORAL
Qty: 48 EACH | Refills: 2 | OUTPATIENT
Start: 2022-11-11

## 2024-02-07 ENCOUNTER — HOSPITAL ENCOUNTER (EMERGENCY)
Age: 54
Discharge: HOME | End: 2024-02-07
Payer: COMMERCIAL

## 2024-02-07 ENCOUNTER — APPOINTMENT (OUTPATIENT)
Dept: GENERAL RADIOLOGY | Age: 54
End: 2024-02-07
Payer: COMMERCIAL

## 2024-02-07 VITALS
WEIGHT: 185 LBS | OXYGEN SATURATION: 100 % | DIASTOLIC BLOOD PRESSURE: 87 MMHG | RESPIRATION RATE: 18 BRPM | HEIGHT: 68 IN | TEMPERATURE: 98.7 F | SYSTOLIC BLOOD PRESSURE: 136 MMHG | BODY MASS INDEX: 28.04 KG/M2 | HEART RATE: 70 BPM

## 2024-02-07 DIAGNOSIS — S61.213A LACERATION OF LEFT MIDDLE FINGER WITHOUT FOREIGN BODY WITHOUT DAMAGE TO NAIL, INITIAL ENCOUNTER: Primary | ICD-10-CM

## 2024-02-07 PROCEDURE — 12001 RPR S/N/AX/GEN/TRNK 2.5CM/<: CPT

## 2024-02-07 PROCEDURE — 90471 IMMUNIZATION ADMIN: CPT | Performed by: STUDENT IN AN ORGANIZED HEALTH CARE EDUCATION/TRAINING PROGRAM

## 2024-02-07 PROCEDURE — 6360000002 HC RX W HCPCS: Performed by: STUDENT IN AN ORGANIZED HEALTH CARE EDUCATION/TRAINING PROGRAM

## 2024-02-07 PROCEDURE — 73130 X-RAY EXAM OF HAND: CPT

## 2024-02-07 PROCEDURE — 99284 EMERGENCY DEPT VISIT MOD MDM: CPT

## 2024-02-07 PROCEDURE — 90714 TD VACC NO PRESV 7 YRS+ IM: CPT | Performed by: STUDENT IN AN ORGANIZED HEALTH CARE EDUCATION/TRAINING PROGRAM

## 2024-02-07 RX ORDER — TETANUS AND DIPHTHERIA TOXOIDS ADSORBED 2; 2 [LF]/.5ML; [LF]/.5ML
0.5 INJECTION INTRAMUSCULAR
Status: COMPLETED | OUTPATIENT
Start: 2024-02-07 | End: 2024-02-07

## 2024-02-07 RX ADMIN — TETANUS AND DIPHTHERIA TOXOIDS ADSORBED 0.5 ML: 2; 2 INJECTION INTRAMUSCULAR at 15:32

## 2024-02-07 ASSESSMENT — ENCOUNTER SYMPTOMS
COUGH: 0
VOMITING: 0
TROUBLE SWALLOWING: 0
ABDOMINAL PAIN: 0
PHOTOPHOBIA: 0
SHORTNESS OF BREATH: 0
FACIAL SWELLING: 0

## 2024-02-07 ASSESSMENT — PAIN - FUNCTIONAL ASSESSMENT: PAIN_FUNCTIONAL_ASSESSMENT: NONE - DENIES PAIN

## 2024-02-07 NOTE — ED NOTES
I have reviewed discharge instructions with the patient.  The patient verbalized understanding.    Patient left ED via Discharge Method: ambulatory to Home with spouse    Opportunity for questions and clarification provided.       Patient given 0 scripts.         To continue your aftercare when you leave the hospital, you may receive an automated call from our care team to check in on how you are doing.  This is a free service and part of our promise to provide the best care and service to meet your aftercare needs.” If you have questions, or wish to unsubscribe from this service please call [phone].  Thank you for Choosing our [facility] Emergency Department.

## 2024-02-07 NOTE — ED PROVIDER NOTES
packs/day: 1.00     Average packs/day: 1 pack/day for 34.6 years (34.6 ttl pk-yrs)     Types: Cigarettes     Start date: 7/12/1989    Smokeless tobacco: Never   Substance and Sexual Activity    Alcohol use: No    Drug use: Never     Types: Opiates , Heroin        Discharge Medication List as of 2/7/2024  4:04 PM        CONTINUE these medications which have NOT CHANGED    Details   predniSONE 5 MG (48) TBPK Take as directed per package instructions, Disp-1 each, R-2Normal      buprenorphine-naloxone (SUBOXONE) 8-2 MG SUBL SL tablet Place 1 tablet under the tongue in the morning and at bedtime.Historical Med              Results for orders placed or performed during the hospital encounter of 02/07/24   XR HAND LEFT (MIN 3 VIEWS)    Narrative    Left hand    INDICATION: Injury    COMPARISON:  None    TECHNIQUE: Three views of the left hand were obtained.    FINDINGS: There is no evidence of fracture or dislocation. There are no bony  erosions. 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 Moderate degenerative changes are also seen in the  first carpometacarpal articulation.      Impression    No acute fracture with degenerative changes in the wrist and hand.          XR HAND LEFT (MIN 3 VIEWS)   Final Result   No acute fracture with degenerative changes in the wrist and hand.                           Voice dictation software was used during the making of this note.  This software is not perfect and grammatical and other typographical errors may be present.  This note has not been completely proofread for errors.     Tod Neri PA  02/07/24 1949

## 2024-02-07 NOTE — DISCHARGE INSTRUCTIONS
You have 3 stitches placed into your finger today.  Keep the area clean and dry.  Monitor for signs of infection return immediately if notice any.  Otherwise please return 10 to 14 days for stitch removal    As we discussed, I did not find a life threatening cause of your symptoms today. However, THAT DOES NOT MEAN IT COULD NOT DEVELOP. If you develop ANY new or worsening symptoms, it is critical that you return for re-evaluation. This includes any symptoms that are concerning to you, especially symptoms such as fevers, redness around the wound, cranial drainage from the wound.  If you do not return for re-evaluation, you risk serious complications, including death.

## 2024-05-10 ENCOUNTER — TRANSCRIBE ORDERS (OUTPATIENT)
Facility: HOSPITAL | Age: 54
End: 2024-05-10

## 2024-05-10 DIAGNOSIS — M25.561 RIGHT KNEE PAIN, UNSPECIFIED CHRONICITY: Primary | ICD-10-CM

## 2025-07-07 ENCOUNTER — HOSPITAL ENCOUNTER (EMERGENCY)
Age: 55
Discharge: LEFT BEFORE DISCHARGE | End: 2025-07-07
Payer: COMMERCIAL

## 2025-07-07 ENCOUNTER — APPOINTMENT (OUTPATIENT)
Dept: GENERAL RADIOLOGY | Age: 55
End: 2025-07-07
Payer: COMMERCIAL

## 2025-07-07 VITALS
DIASTOLIC BLOOD PRESSURE: 85 MMHG | HEIGHT: 69 IN | WEIGHT: 185 LBS | BODY MASS INDEX: 27.4 KG/M2 | OXYGEN SATURATION: 100 % | HEART RATE: 95 BPM | RESPIRATION RATE: 16 BRPM | SYSTOLIC BLOOD PRESSURE: 121 MMHG | TEMPERATURE: 98.2 F

## 2025-07-07 DIAGNOSIS — M70.41 PREPATELLAR BURSITIS OF RIGHT KNEE: ICD-10-CM

## 2025-07-07 DIAGNOSIS — M25.561 ACUTE PAIN OF RIGHT KNEE: Primary | ICD-10-CM

## 2025-07-07 DIAGNOSIS — L02.415 ABSCESS OF RIGHT KNEE: ICD-10-CM

## 2025-07-07 PROCEDURE — 99283 EMERGENCY DEPT VISIT LOW MDM: CPT

## 2025-07-07 PROCEDURE — 73562 X-RAY EXAM OF KNEE 3: CPT

## 2025-07-07 ASSESSMENT — PAIN SCALES - GENERAL: PAINLEVEL_OUTOF10: 8

## 2025-07-07 ASSESSMENT — PAIN DESCRIPTION - DESCRIPTORS: DESCRIPTORS: SHARP;SHOOTING

## 2025-07-07 ASSESSMENT — PAIN DESCRIPTION - LOCATION: LOCATION: KNEE

## 2025-07-07 ASSESSMENT — PAIN DESCRIPTION - PAIN TYPE: TYPE: ACUTE PAIN

## 2025-07-07 ASSESSMENT — PAIN - FUNCTIONAL ASSESSMENT
PAIN_FUNCTIONAL_ASSESSMENT: PREVENTS OR INTERFERES SOME ACTIVE ACTIVITIES AND ADLS
PAIN_FUNCTIONAL_ASSESSMENT: 0-10

## 2025-07-07 ASSESSMENT — PAIN DESCRIPTION - ORIENTATION: ORIENTATION: RIGHT

## 2025-07-07 ASSESSMENT — LIFESTYLE VARIABLES
HOW MANY STANDARD DRINKS CONTAINING ALCOHOL DO YOU HAVE ON A TYPICAL DAY: PATIENT DOES NOT DRINK
HOW MANY STANDARD DRINKS CONTAINING ALCOHOL DO YOU HAVE ON A TYPICAL DAY: PATIENT DOES NOT DRINK
HOW OFTEN DO YOU HAVE A DRINK CONTAINING ALCOHOL: NEVER

## 2025-07-07 NOTE — ED PROVIDER NOTES
during this emergency department visit:   Medications - No data to display    New prescriptions:     Discharge Medication List as of 7/7/2025  8:41 PM           Past History and Complexity:     Past Medical History:   Diagnosis Date    Anal fistula     Arthritis     OA     Current smoker     Depression     Hepatitis C     History of drug abuse (HCC)     Infectious disease     Hep C    Sciatic nerve pain     Snores         Past Surgical History:   Procedure Laterality Date    HERNIA REPAIR  1996/2011    right inguinal/ left inguinal    OTHER SURGICAL HISTORY  12/08/2017    anal fistula        Social History     Socioeconomic History    Marital status:      Spouse name: None    Number of children: None    Years of education: None    Highest education level: None   Tobacco Use    Smoking status: Every Day     Current packs/day: 1.00     Average packs/day: 1 pack/day for 36.0 years (36.0 ttl pk-yrs)     Types: Cigarettes     Start date: 7/12/1989    Smokeless tobacco: Never   Substance and Sexual Activity    Alcohol use: No    Drug use: Never     Types: Opiates , Heroin     Social Drivers of Health     Social Connections: Unknown (3/20/2021)    Received from [facility]    Social Connections     Frequency of Communication with Friends and Family: Not asked     Frequency of Social Gatherings with Friends and Family: Not asked   Intimate Partner Violence: Unknown (3/20/2021)    Received from [facility]    Intimate Partner Violence     Fear of Current or Ex-Partner: Not asked     Emotionally Abused: Not asked     Physically Abused: Not asked     Sexually Abused: Not asked   Housing Stability: Not At Risk (3/9/2022)    Received from [facility]    Housing Stability     Was there a time when you did not have a steady place to sleep: Unrecognized value     Worried that the place you are staying is making you sick: Unrecognized value        Discharge Medication List as

## 2025-07-07 NOTE — ED TRIAGE NOTES
Pt c/o right knee pain and swelling that began 2 weeks ago.  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

## (undated) DEVICE — SUT SLK 0 18IN FSL BLK --

## (undated) DEVICE — SUTURE ETHBND EXCEL SZ 1 L30IN NONABSORBABLE GRN L36MM CT-1 X425H

## (undated) DEVICE — SCOPE RECT LED W INSUFFLATOR

## (undated) DEVICE — JELLY LUBRICATING 10GM PREFIL SYR LUBE

## (undated) DEVICE — SUTURE ETHLN SZ 2-0 L18IN NONABSORBABLE BLK L19MM PS-2 PRIM 593H

## (undated) DEVICE — MATERNITY KNIT PANTS,SEAMLESS: Brand: WINGS

## (undated) DEVICE — SURGICAL PROCEDURE PACK BASIC ST FRANCIS

## (undated) DEVICE — AMD ANTIMICROBIAL BANDAGE ROLL,6 PLY: Brand: KERLIX

## (undated) DEVICE — REM POLYHESIVE ADULT PATIENT RETURN ELECTRODE: Brand: VALLEYLAB

## (undated) DEVICE — SHEET, T, LAPAROTOMY, STERILE: Brand: MEDLINE

## (undated) DEVICE — NEEDLE HYPO 21GA L1.5IN INTRAMUSCULAR S STL LATCH BVL UP

## (undated) DEVICE — SUTURE PDS II SZ 3-0 L27IN ABSRB VLT L26MM SH 1/2 CIR Z316H

## (undated) DEVICE — 2000CC GUARDIAN II: Brand: GUARDIAN

## (undated) DEVICE — SOLUTION IV 1000ML 0.9% SOD CHL

## (undated) DEVICE — AMD ANTIMICROBIAL GAUZE SPONGES,12 PLY USP TYPE VII, 0.2% POLYHEXAMETHYLENE BIGUANIDE HCI (PHMB): Brand: CURITY

## (undated) DEVICE — SUTURE VCRL SZ 3-0 L27IN ABSRB UD L26MM SH 1/2 CIR J416H

## (undated) DEVICE — TRAY PREP DRY W/ PREM GLV 2 APPL 6 SPNG 2 UNDPD 1 OVERWRAP

## (undated) DEVICE — INTENDED TO BE USED TO OCCLUDE, RETRACT AND IDENTIFY ARTERIES, VEINS, TENDONS AND NERVES IN SURGICAL PROCEDURES: Brand: STERION®  VESSEL LOOP